# Patient Record
Sex: MALE | Race: BLACK OR AFRICAN AMERICAN | NOT HISPANIC OR LATINO | Employment: OTHER | URBAN - METROPOLITAN AREA
[De-identification: names, ages, dates, MRNs, and addresses within clinical notes are randomized per-mention and may not be internally consistent; named-entity substitution may affect disease eponyms.]

---

## 2017-05-18 ENCOUNTER — HISTORICAL (OUTPATIENT)
Dept: RADIOLOGY | Facility: HOSPITAL | Age: 62
End: 2017-05-18

## 2018-01-08 ENCOUNTER — HISTORICAL (OUTPATIENT)
Dept: ADMINISTRATIVE | Facility: HOSPITAL | Age: 63
End: 2018-01-08

## 2018-01-08 LAB
ABS NEUT (OLG): 0.75 X10(3)/MCL
ALBUMIN SERPL-MCNC: 3.7 GM/DL (ref 3.4–5)
ALBUMIN/GLOB SERPL: 1 RATIO (ref 1–2)
ALP SERPL-CCNC: 56 UNIT/L (ref 45–117)
ALT SERPL-CCNC: 49 UNIT/L (ref 12–78)
AMPHET UR QL SCN: NEGATIVE
AST SERPL-CCNC: 50 UNIT/L (ref 15–37)
BARBITURATE SCN PRESENT UR: NEGATIVE
BASOPHILS NFR BLD MANUAL: 3 %
BENZODIAZ UR QL SCN: NEGATIVE
BILIRUB SERPL-MCNC: 0.3 MG/DL (ref 0.2–1)
BILIRUBIN DIRECT+TOT PNL SERPL-MCNC: 0.1 MG/DL
BILIRUBIN DIRECT+TOT PNL SERPL-MCNC: 0.2 MG/DL
BUN SERPL-MCNC: 11 MG/DL (ref 7–18)
CALCIUM SERPL-MCNC: 8.9 MG/DL (ref 8.5–10.1)
CANNABINOIDS UR QL SCN: NEGATIVE
CHLORIDE SERPL-SCNC: 105 MMOL/L (ref 98–107)
CO2 SERPL-SCNC: 30 MMOL/L (ref 21–32)
COCAINE UR QL SCN: NEGATIVE
CREAT SERPL-MCNC: 0.8 MG/DL (ref 0.6–1.3)
EOSINOPHIL NFR BLD MANUAL: 8 %
ERYTHROCYTE [DISTWIDTH] IN BLOOD BY AUTOMATED COUNT: 14.2 % (ref 11.5–14.5)
ETHANOL SERPL-MCNC: <3 MG/DL
FERRITIN SERPL-MCNC: 199.3 NG/ML (ref 26–388)
GLOBULIN SER-MCNC: 5 GM/ML (ref 2.3–3.5)
GLUCOSE SERPL-MCNC: 83 MG/DL (ref 74–106)
GRANULOCYTES NFR BLD MANUAL: 18 % (ref 43–75)
HAV AB SER QL IA: REACTIVE
HBV CORE AB SERPL QL IA: REACTIVE
HBV SURFACE AB SER-ACNC: >1000 MIU/ML
HBV SURFACE AB SERPL IA-ACNC: REACTIVE M[IU]/ML
HBV SURFACE AG SERPL QL IA: NEGATIVE
HCT VFR BLD AUTO: 42.1 % (ref 40–51)
HGB BLD-MCNC: 14.1 GM/DL (ref 13.5–17.5)
HIV 1+2 AB+HIV1 P24 AG SERPL QL IA: NONREACTIVE
INR PPP: 1.12 (ref 0.9–1.2)
IRON SATN MFR SERPL: 34.5 % (ref 15–50)
IRON SERPL-MCNC: 119 MCG/DL (ref 65–175)
LYMPHOCYTES NFR BLD MANUAL: 1 %
LYMPHOCYTES NFR BLD MANUAL: 62 % (ref 20.5–51.1)
MCH RBC QN AUTO: 29.9 PG (ref 26–34)
MCHC RBC AUTO-ENTMCNC: 33.5 GM/DL (ref 31–37)
MCV RBC AUTO: 89.4 FL (ref 80–100)
MONOCYTES NFR BLD MANUAL: 8 % (ref 2–9)
OPIATES UR QL SCN: NEGATIVE
PCP UR QL: NEGATIVE
PH UR STRIP.AUTO: 7.5 [PH] (ref 5–8)
PLATELET # BLD AUTO: 235 X10(3)/MCL (ref 130–400)
PLATELET # BLD EST: NORMAL 10*3/UL
PMV BLD AUTO: 10.2 FL (ref 7.4–10.4)
POTASSIUM SERPL-SCNC: 4.2 MMOL/L (ref 3.5–5.1)
PROT SERPL-MCNC: 8.7 GM/DL (ref 6.4–8.2)
PROTHROMBIN TIME: 14.2 SECOND(S) (ref 11.9–14.4)
RBC # BLD AUTO: 4.71 X10(6)/MCL (ref 4.5–5.9)
RBC MORPH BLD: NORMAL
SODIUM SERPL-SCNC: 141 MMOL/L (ref 136–145)
T4 FREE SERPL-MCNC: 0.96 NG/DL (ref 0.76–1.46)
TEMPERATURE, URINE (OHS): 24.9 DEGC (ref 20–25)
TIBC SERPL-MCNC: 345 MCG/DL (ref 250–450)
TRANSFERRIN SERPL-MCNC: 279 MG/DL (ref 200–360)
TSH SERPL-ACNC: 1.41 MIU/L (ref 0.36–3.74)
WBC # SPEC AUTO: 4 X10(3)/MCL (ref 4.5–11)

## 2018-03-08 ENCOUNTER — HISTORICAL (OUTPATIENT)
Dept: RADIOLOGY | Facility: HOSPITAL | Age: 63
End: 2018-03-08

## 2018-03-23 ENCOUNTER — HISTORICAL (OUTPATIENT)
Dept: RADIOLOGY | Facility: HOSPITAL | Age: 63
End: 2018-03-23

## 2018-05-11 ENCOUNTER — HISTORICAL (OUTPATIENT)
Dept: ADMINISTRATIVE | Facility: HOSPITAL | Age: 63
End: 2018-05-11

## 2018-05-11 LAB
ABS NEUT (OLG): 0.81 X10(3)/MCL (ref 2.1–9.2)
ALBUMIN SERPL-MCNC: 3.6 GM/DL (ref 3.4–5)
ALBUMIN/GLOB SERPL: 1 RATIO (ref 1–2)
ALP SERPL-CCNC: 62 UNIT/L (ref 45–117)
ALT SERPL-CCNC: 44 UNIT/L (ref 12–78)
AMPHET UR QL SCN: NEGATIVE
AST SERPL-CCNC: 43 UNIT/L (ref 15–37)
BARBITURATE SCN PRESENT UR: NEGATIVE
BASOPHILS # BLD AUTO: 0.03 X10(3)/MCL
BASOPHILS NFR BLD AUTO: 1 %
BENZODIAZ UR QL SCN: NEGATIVE
BILIRUB SERPL-MCNC: 0.3 MG/DL (ref 0.2–1)
BILIRUBIN DIRECT+TOT PNL SERPL-MCNC: 0.1 MG/DL
BILIRUBIN DIRECT+TOT PNL SERPL-MCNC: 0.2 MG/DL
BUN SERPL-MCNC: 19 MG/DL (ref 7–18)
CALCIUM SERPL-MCNC: 8.5 MG/DL (ref 8.5–10.1)
CANNABINOIDS UR QL SCN: NEGATIVE
CHLORIDE SERPL-SCNC: 107 MMOL/L (ref 98–107)
CO2 SERPL-SCNC: 29 MMOL/L (ref 21–32)
COCAINE UR QL SCN: NEGATIVE
CREAT SERPL-MCNC: 0.8 MG/DL (ref 0.6–1.3)
EOSINOPHIL # BLD AUTO: 0.1 X10(3)/MCL
EOSINOPHIL NFR BLD AUTO: 3 %
ERYTHROCYTE [DISTWIDTH] IN BLOOD BY AUTOMATED COUNT: 14.3 % (ref 11.5–14.5)
ETHANOL SERPL-MCNC: <3 MG/DL
GLOBULIN SER-MCNC: 4.3 GM/ML (ref 2.3–3.5)
GLUCOSE SERPL-MCNC: 68 MG/DL (ref 74–106)
HCT VFR BLD AUTO: 41.2 % (ref 40–51)
HGB BLD-MCNC: 13.6 GM/DL (ref 13.5–17.5)
IMM GRANULOCYTES # BLD AUTO: 0.01 10*3/UL
IMM GRANULOCYTES NFR BLD AUTO: 0 %
INR PPP: 1.15 (ref 0.9–1.2)
LYMPHOCYTES # BLD AUTO: 2.11 X10(3)/MCL
LYMPHOCYTES NFR BLD AUTO: 63 % (ref 13–40)
MCH RBC QN AUTO: 29.8 PG (ref 26–34)
MCHC RBC AUTO-ENTMCNC: 33 GM/DL (ref 31–37)
MCV RBC AUTO: 90.2 FL (ref 80–100)
MONOCYTES # BLD AUTO: 0.27 X10(3)/MCL
MONOCYTES NFR BLD AUTO: 8 % (ref 4–12)
NEUTROPHILS # BLD AUTO: 0.81 X10(3)/MCL
NEUTROPHILS NFR BLD AUTO: 24 X10(3)/MCL
OPIATES UR QL SCN: NEGATIVE
PCP UR QL: NEGATIVE
PH UR STRIP.AUTO: 6.5 [PH] (ref 5–8)
PLATELET # BLD AUTO: 232 X10(3)/MCL (ref 130–400)
PMV BLD AUTO: 10 FL (ref 7.4–10.4)
POTASSIUM SERPL-SCNC: 4.8 MMOL/L (ref 3.5–5.1)
PROT SERPL-MCNC: 7.9 GM/DL (ref 6.4–8.2)
PROTHROMBIN TIME: 14 SECOND(S) (ref 11.9–14.4)
RBC # BLD AUTO: 4.57 X10(6)/MCL (ref 4.5–5.9)
SODIUM SERPL-SCNC: 138 MMOL/L (ref 136–145)
TEMPERATURE, URINE (OHS): 24.2 DEGC (ref 20–25)
WBC # SPEC AUTO: 3.3 X10(3)/MCL (ref 4.5–11)

## 2018-10-25 ENCOUNTER — HISTORICAL (OUTPATIENT)
Dept: ADMINISTRATIVE | Facility: HOSPITAL | Age: 63
End: 2018-10-25

## 2018-10-25 LAB
ALBUMIN SERPL-MCNC: 3.8 GM/DL (ref 3.4–5)
ALBUMIN/GLOB SERPL: 1 RATIO (ref 1–2)
ALP SERPL-CCNC: 53 UNIT/L (ref 45–117)
ALT SERPL-CCNC: 59 UNIT/L (ref 12–78)
AST SERPL-CCNC: 52 UNIT/L (ref 15–37)
BILIRUB SERPL-MCNC: 0.4 MG/DL (ref 0.2–1)
BILIRUBIN DIRECT+TOT PNL SERPL-MCNC: 0.1 MG/DL
BILIRUBIN DIRECT+TOT PNL SERPL-MCNC: 0.3 MG/DL
BUN SERPL-MCNC: 15 MG/DL (ref 7–18)
CALCIUM SERPL-MCNC: 9.3 MG/DL (ref 8.5–10.1)
CHLORIDE SERPL-SCNC: 105 MMOL/L (ref 98–107)
CO2 SERPL-SCNC: 29 MMOL/L (ref 21–32)
CREAT SERPL-MCNC: 0.8 MG/DL (ref 0.6–1.3)
ERYTHROCYTE [DISTWIDTH] IN BLOOD BY AUTOMATED COUNT: 14.5 % (ref 11.5–14.5)
GLOBULIN SER-MCNC: 5 GM/ML (ref 2.3–3.5)
GLUCOSE SERPL-MCNC: 79 MG/DL (ref 74–106)
HCT VFR BLD AUTO: 42.1 % (ref 40–51)
HGB BLD-MCNC: 14 GM/DL (ref 13.5–17.5)
MCH RBC QN AUTO: 29.6 PG (ref 26–34)
MCHC RBC AUTO-ENTMCNC: 33.3 GM/DL (ref 31–37)
MCV RBC AUTO: 89 FL (ref 80–100)
PLATELET # BLD AUTO: 246 X10(3)/MCL (ref 130–400)
PMV BLD AUTO: 10.9 FL (ref 7.4–10.4)
POTASSIUM SERPL-SCNC: 5.2 MMOL/L (ref 3.5–5.1)
PROT SERPL-MCNC: 8.8 GM/DL (ref 6.4–8.2)
RBC # BLD AUTO: 4.73 X10(6)/MCL (ref 4.5–5.9)
SODIUM SERPL-SCNC: 141 MMOL/L (ref 136–145)
WBC # SPEC AUTO: 4.2 X10(3)/MCL (ref 4.5–11)

## 2018-11-01 ENCOUNTER — HISTORICAL (OUTPATIENT)
Dept: SURGERY | Facility: HOSPITAL | Age: 63
End: 2018-11-01

## 2018-11-01 LAB
AMPHET UR QL SCN: NEGATIVE
BARBITURATE SCN PRESENT UR: NEGATIVE
BENZODIAZ UR QL SCN: NEGATIVE
CANNABINOIDS UR QL SCN: NEGATIVE
COCAINE UR QL SCN: NEGATIVE
OPIATES UR QL SCN: NEGATIVE
PCP UR QL: NEGATIVE
PH UR STRIP.AUTO: 6 [PH] (ref 5–8)
TEMPERATURE, URINE (OHS): 25 DEGC (ref 20–25)

## 2018-11-15 ENCOUNTER — HISTORICAL (OUTPATIENT)
Dept: SURGERY | Facility: HOSPITAL | Age: 63
End: 2018-11-15

## 2019-03-04 ENCOUNTER — HISTORICAL (OUTPATIENT)
Dept: ADMINISTRATIVE | Facility: HOSPITAL | Age: 64
End: 2019-03-04

## 2019-03-04 LAB
ABS NEUT (OLG): 0.99 X10(3)/MCL (ref 2.1–9.2)
ALBUMIN SERPL-MCNC: 3.8 GM/DL (ref 3.4–5)
ALBUMIN/GLOB SERPL: 0.8 RATIO (ref 1.1–2)
ALP SERPL-CCNC: 53 UNIT/L (ref 45–117)
ALT SERPL-CCNC: 60 UNIT/L (ref 12–78)
AST SERPL-CCNC: 51 UNIT/L (ref 15–37)
BASOPHILS # BLD AUTO: 0.03 X10(3)/MCL
BASOPHILS NFR BLD AUTO: 1 %
BILIRUB SERPL-MCNC: 0.4 MG/DL (ref 0.2–1)
BILIRUBIN DIRECT+TOT PNL SERPL-MCNC: 0.2 MG/DL
BILIRUBIN DIRECT+TOT PNL SERPL-MCNC: 0.2 MG/DL
BUN SERPL-MCNC: 16 MG/DL (ref 7–18)
CALCIUM SERPL-MCNC: 9.1 MG/DL (ref 8.5–10.1)
CHLORIDE SERPL-SCNC: 106 MMOL/L (ref 98–107)
CO2 SERPL-SCNC: 30 MMOL/L (ref 21–32)
CREAT SERPL-MCNC: 0.9 MG/DL (ref 0.6–1.3)
EOSINOPHIL # BLD AUTO: 0.18 X10(3)/MCL
EOSINOPHIL NFR BLD AUTO: 5 %
ERYTHROCYTE [DISTWIDTH] IN BLOOD BY AUTOMATED COUNT: 14.2 % (ref 11.5–14.5)
FERRITIN SERPL-MCNC: 199.7 NG/ML (ref 26–388)
GLOBULIN SER-MCNC: 5 GM/ML (ref 2.3–3.5)
GLUCOSE SERPL-MCNC: 56 MG/DL (ref 74–106)
HAV AB SER QL IA: REACTIVE
HBV CORE AB SERPL QL IA: REACTIVE
HBV SURFACE AB SER-ACNC: >1000 MIU/ML
HBV SURFACE AB SERPL IA-ACNC: REACTIVE M[IU]/ML
HBV SURFACE AG SERPL QL IA: NEGATIVE
HCT VFR BLD AUTO: 44.6 % (ref 40–51)
HGB BLD-MCNC: 14.7 GM/DL (ref 13.5–17.5)
HIV 1+2 AB+HIV1 P24 AG SERPL QL IA: NONREACTIVE
IMM GRANULOCYTES # BLD AUTO: 0.01 10*3/UL
IMM GRANULOCYTES NFR BLD AUTO: 0 %
INR PPP: 1.14 (ref 0.9–1.2)
IRON SATN MFR SERPL: 43.5 % (ref 15–50)
IRON SERPL-MCNC: 155 MCG/DL (ref 65–175)
LYMPHOCYTES # BLD AUTO: 2.35 X10(3)/MCL
LYMPHOCYTES NFR BLD AUTO: 62 % (ref 13–40)
MCH RBC QN AUTO: 29.6 PG (ref 26–34)
MCHC RBC AUTO-ENTMCNC: 33 GM/DL (ref 31–37)
MCV RBC AUTO: 89.9 FL (ref 80–100)
MONOCYTES # BLD AUTO: 0.26 X10(3)/MCL
MONOCYTES NFR BLD AUTO: 7 % (ref 4–12)
NEUTROPHILS # BLD AUTO: 0.99 X10(3)/MCL
NEUTROPHILS NFR BLD AUTO: 26 X10(3)/MCL
PLATELET # BLD AUTO: 245 X10(3)/MCL (ref 130–400)
PMV BLD AUTO: 10.3 FL (ref 7.4–10.4)
POTASSIUM SERPL-SCNC: 4 MMOL/L (ref 3.5–5.1)
PROT SERPL-MCNC: 8.8 GM/DL (ref 6.4–8.2)
PROTHROMBIN TIME: 14.5 SECOND(S) (ref 11.9–14.4)
RBC # BLD AUTO: 4.96 X10(6)/MCL (ref 4.5–5.9)
SODIUM SERPL-SCNC: 139 MMOL/L (ref 136–145)
T PALLIDUM AB SER QL: NONREACTIVE
T4 FREE SERPL-MCNC: 0.85 NG/DL (ref 0.76–1.46)
TIBC SERPL-MCNC: 356 MCG/DL (ref 250–450)
TRANSFERRIN SERPL-MCNC: 283 MG/DL (ref 200–360)
TSH SERPL-ACNC: 0.66 MIU/L (ref 0.36–3.74)
WBC # SPEC AUTO: 3.8 X10(3)/MCL (ref 4.5–11)

## 2019-04-05 ENCOUNTER — HISTORICAL (OUTPATIENT)
Dept: RADIOLOGY | Facility: HOSPITAL | Age: 64
End: 2019-04-05

## 2019-05-17 ENCOUNTER — HISTORICAL (OUTPATIENT)
Dept: RADIOLOGY | Facility: HOSPITAL | Age: 64
End: 2019-05-17

## 2019-05-17 LAB
BUN SERPL-MCNC: 17 MG/DL (ref 7–18)
CALCIUM SERPL-MCNC: 8.9 MG/DL (ref 8.5–10.1)
CHLORIDE SERPL-SCNC: 108 MMOL/L (ref 98–107)
CO2 SERPL-SCNC: 31 MMOL/L (ref 21–32)
CREAT SERPL-MCNC: 0.9 MG/DL (ref 0.6–1.3)
CREAT/UREA NIT SERPL: 19
GLUCOSE SERPL-MCNC: 85 MG/DL (ref 74–106)
POTASSIUM SERPL-SCNC: 4.6 MMOL/L (ref 3.5–5.1)
SODIUM SERPL-SCNC: 140 MMOL/L (ref 136–145)

## 2019-12-03 ENCOUNTER — HISTORICAL (OUTPATIENT)
Dept: ADMINISTRATIVE | Facility: HOSPITAL | Age: 64
End: 2019-12-03

## 2019-12-03 LAB
ABS NEUT (OLG): 0.71 X10(3)/MCL (ref 2.1–9.2)
ALBUMIN SERPL-MCNC: 3.7 GM/DL (ref 3.4–5)
ALBUMIN/GLOB SERPL: 0.8 RATIO (ref 1.1–2)
ALP SERPL-CCNC: 55 UNIT/L (ref 45–117)
ALT SERPL-CCNC: 118 UNIT/L (ref 12–78)
AST SERPL-CCNC: 108 UNIT/L (ref 15–37)
BASOPHILS # BLD AUTO: 0 X10(3)/MCL (ref 0–0.2)
BASOPHILS NFR BLD AUTO: 1 %
BILIRUB SERPL-MCNC: 0.4 MG/DL (ref 0.2–1)
BILIRUBIN DIRECT+TOT PNL SERPL-MCNC: 0.2 MG/DL
BILIRUBIN DIRECT+TOT PNL SERPL-MCNC: 0.2 MG/DL (ref 0–0.2)
BUN SERPL-MCNC: 12 MG/DL (ref 7–18)
CALCIUM SERPL-MCNC: 9.2 MG/DL (ref 8.5–10.1)
CHLORIDE SERPL-SCNC: 106 MMOL/L (ref 98–107)
CO2 SERPL-SCNC: 30 MMOL/L (ref 21–32)
CREAT SERPL-MCNC: 0.8 MG/DL (ref 0.6–1.3)
EOSINOPHIL # BLD AUTO: 0.2 X10(3)/MCL (ref 0–0.9)
EOSINOPHIL NFR BLD AUTO: 7 %
ERYTHROCYTE [DISTWIDTH] IN BLOOD BY AUTOMATED COUNT: 14 % (ref 11.5–14.5)
FERRITIN SERPL-MCNC: 263 NG/ML (ref 26–388)
GLOBULIN SER-MCNC: 4.9 GM/ML (ref 2.3–3.5)
GLUCOSE SERPL-MCNC: 92 MG/DL (ref 74–106)
HAV AB SER QL IA: REACTIVE
HBV SURFACE AG SERPL QL IA: NEGATIVE
HCT VFR BLD AUTO: 43.6 % (ref 40–51)
HGB BLD-MCNC: 14.4 GM/DL (ref 13.5–17.5)
HIV 1+2 AB+HIV1 P24 AG SERPL QL IA: NONREACTIVE
IMM GRANULOCYTES # BLD AUTO: 0.01 10*3/UL
IMM GRANULOCYTES NFR BLD AUTO: 0 %
INR PPP: 1.1 (ref 0.9–1.2)
LYMPHOCYTES # BLD AUTO: 1.9 X10(3)/MCL (ref 0.6–4.6)
LYMPHOCYTES NFR BLD AUTO: 62 %
MCH RBC QN AUTO: 29.6 PG (ref 26–34)
MCHC RBC AUTO-ENTMCNC: 33 GM/DL (ref 31–37)
MCV RBC AUTO: 89.7 FL (ref 80–100)
MONOCYTES # BLD AUTO: 0.3 X10(3)/MCL (ref 0.1–1.3)
MONOCYTES NFR BLD AUTO: 8 %
NEUTROPHILS # BLD AUTO: 0.71 X10(3)/MCL (ref 2.1–9.2)
NEUTROPHILS NFR BLD AUTO: 22 %
PLATELET # BLD AUTO: 246 X10(3)/MCL (ref 130–400)
PMV BLD AUTO: 10.4 FL (ref 7.4–10.4)
POTASSIUM SERPL-SCNC: 4.3 MMOL/L (ref 3.5–5.1)
PROT SERPL-MCNC: 8.6 GM/DL (ref 6.4–8.2)
PROTHROMBIN TIME: 14.1 SECOND(S) (ref 11.9–14.4)
RBC # BLD AUTO: 4.86 X10(6)/MCL (ref 4.5–5.9)
SODIUM SERPL-SCNC: 140 MMOL/L (ref 136–145)
T PALLIDUM AB SER QL: NONREACTIVE
WBC # SPEC AUTO: 3.2 X10(3)/MCL (ref 4.5–11)

## 2020-02-10 ENCOUNTER — HISTORICAL (OUTPATIENT)
Dept: ADMINISTRATIVE | Facility: HOSPITAL | Age: 65
End: 2020-02-10

## 2020-02-10 LAB
ABS NEUT (OLG): 1.45 X10(3)/MCL (ref 2.1–9.2)
ALBUMIN SERPL-MCNC: 3.6 GM/DL (ref 3.4–5)
ALBUMIN/GLOB SERPL: 0.6 RATIO (ref 1.1–2)
ALP SERPL-CCNC: 64 UNIT/L (ref 45–117)
ALT SERPL-CCNC: 32 UNIT/L (ref 12–78)
AST SERPL-CCNC: 32 UNIT/L (ref 15–37)
BASOPHILS # BLD AUTO: 0 X10(3)/MCL (ref 0–0.2)
BASOPHILS NFR BLD AUTO: 1 %
BILIRUB SERPL-MCNC: 0.4 MG/DL (ref 0.2–1)
BILIRUBIN DIRECT+TOT PNL SERPL-MCNC: 0.1 MG/DL (ref 0–0.2)
BILIRUBIN DIRECT+TOT PNL SERPL-MCNC: 0.3 MG/DL
BUN SERPL-MCNC: 15 MG/DL (ref 7–18)
CALCIUM SERPL-MCNC: 9.8 MG/DL (ref 8.5–10.1)
CHLORIDE SERPL-SCNC: 105 MMOL/L (ref 98–107)
CO2 SERPL-SCNC: 30 MMOL/L (ref 21–32)
CREAT SERPL-MCNC: 0.9 MG/DL (ref 0.6–1.3)
EOSINOPHIL # BLD AUTO: 0.2 X10(3)/MCL (ref 0–0.9)
EOSINOPHIL NFR BLD AUTO: 5 %
ERYTHROCYTE [DISTWIDTH] IN BLOOD BY AUTOMATED COUNT: 13.8 % (ref 11.5–14.5)
GLOBULIN SER-MCNC: 5.8 GM/ML (ref 2.3–3.5)
GLUCOSE SERPL-MCNC: 91 MG/DL (ref 74–106)
HCT VFR BLD AUTO: 44.2 % (ref 40–51)
HGB BLD-MCNC: 14.5 GM/DL (ref 13.5–17.5)
IMM GRANULOCYTES # BLD AUTO: 0.01 10*3/UL
IMM GRANULOCYTES NFR BLD AUTO: 0 %
LYMPHOCYTES # BLD AUTO: 2.6 X10(3)/MCL (ref 0.6–4.6)
LYMPHOCYTES NFR BLD AUTO: 53 %
MCH RBC QN AUTO: 29.5 PG (ref 26–34)
MCHC RBC AUTO-ENTMCNC: 32.8 GM/DL (ref 31–37)
MCV RBC AUTO: 89.8 FL (ref 80–100)
MONOCYTES # BLD AUTO: 0.6 X10(3)/MCL (ref 0.1–1.3)
MONOCYTES NFR BLD AUTO: 12 %
NEUTROPHILS # BLD AUTO: 1.45 X10(3)/MCL (ref 2.1–9.2)
NEUTROPHILS NFR BLD AUTO: 29 %
PLATELET # BLD AUTO: 274 X10(3)/MCL (ref 130–400)
PMV BLD AUTO: 10.3 FL (ref 7.4–10.4)
POTASSIUM SERPL-SCNC: 4.8 MMOL/L (ref 3.5–5.1)
PROT SERPL-MCNC: 9.4 GM/DL (ref 6.4–8.2)
RBC # BLD AUTO: 4.92 X10(6)/MCL (ref 4.5–5.9)
SODIUM SERPL-SCNC: 138 MMOL/L (ref 136–145)
WBC # SPEC AUTO: 4.9 X10(3)/MCL (ref 4.5–11)

## 2020-03-09 ENCOUNTER — HISTORICAL (OUTPATIENT)
Dept: ADMINISTRATIVE | Facility: HOSPITAL | Age: 65
End: 2020-03-09

## 2020-03-09 LAB
ABS NEUT (OLG): 1.04 X10(3)/MCL (ref 2.1–9.2)
ALBUMIN SERPL-MCNC: 3.5 GM/DL (ref 3.4–5)
ALBUMIN/GLOB SERPL: 0.7 RATIO (ref 1.1–2)
ALP SERPL-CCNC: 54 UNIT/L (ref 45–117)
ALT SERPL-CCNC: 35 UNIT/L (ref 12–78)
AST SERPL-CCNC: 37 UNIT/L (ref 15–37)
BASOPHILS # BLD AUTO: 0 X10(3)/MCL (ref 0–0.2)
BASOPHILS NFR BLD AUTO: 1 %
BILIRUB SERPL-MCNC: 0.3 MG/DL (ref 0.2–1)
BILIRUBIN DIRECT+TOT PNL SERPL-MCNC: <0.1 MG/DL (ref 0–0.2)
BILIRUBIN DIRECT+TOT PNL SERPL-MCNC: ABNORMAL MG/DL
BUN SERPL-MCNC: 14 MG/DL (ref 7–18)
CALCIUM SERPL-MCNC: 9.3 MG/DL (ref 8.5–10.1)
CHLORIDE SERPL-SCNC: 109 MMOL/L (ref 98–107)
CO2 SERPL-SCNC: 29 MMOL/L (ref 21–32)
CREAT SERPL-MCNC: 0.8 MG/DL (ref 0.6–1.3)
EOSINOPHIL # BLD AUTO: 0.4 X10(3)/MCL (ref 0–0.9)
EOSINOPHIL NFR BLD AUTO: 9 %
ERYTHROCYTE [DISTWIDTH] IN BLOOD BY AUTOMATED COUNT: 13.7 % (ref 11.5–14.5)
GLOBULIN SER-MCNC: 5 GM/ML (ref 2.3–3.5)
GLUCOSE SERPL-MCNC: 94 MG/DL (ref 74–106)
HCT VFR BLD AUTO: 42.4 % (ref 40–51)
HGB BLD-MCNC: 13.5 GM/DL (ref 13.5–17.5)
IMM GRANULOCYTES # BLD AUTO: 0.01 10*3/UL
IMM GRANULOCYTES NFR BLD AUTO: 0 %
LYMPHOCYTES # BLD AUTO: 2.4 X10(3)/MCL (ref 0.6–4.6)
LYMPHOCYTES NFR BLD AUTO: 56 %
MCH RBC QN AUTO: 28.8 PG (ref 26–34)
MCHC RBC AUTO-ENTMCNC: 31.8 GM/DL (ref 31–37)
MCV RBC AUTO: 90.6 FL (ref 80–100)
MONOCYTES # BLD AUTO: 0.4 X10(3)/MCL (ref 0.1–1.3)
MONOCYTES NFR BLD AUTO: 9 %
NEUTROPHILS # BLD AUTO: 1.04 X10(3)/MCL (ref 2.1–9.2)
NEUTROPHILS NFR BLD AUTO: 25 %
PLATELET # BLD AUTO: 241 X10(3)/MCL (ref 130–400)
PMV BLD AUTO: 10.3 FL (ref 7.4–10.4)
POTASSIUM SERPL-SCNC: 4.7 MMOL/L (ref 3.5–5.1)
PROT SERPL-MCNC: 8.5 GM/DL (ref 6.4–8.2)
RBC # BLD AUTO: 4.68 X10(6)/MCL (ref 4.5–5.9)
SODIUM SERPL-SCNC: 140 MMOL/L (ref 136–145)
WBC # SPEC AUTO: 4.2 X10(3)/MCL (ref 4.5–11)

## 2020-03-11 ENCOUNTER — HISTORICAL (OUTPATIENT)
Dept: RADIOLOGY | Facility: HOSPITAL | Age: 65
End: 2020-03-11

## 2020-04-17 ENCOUNTER — HISTORICAL (OUTPATIENT)
Dept: ADMINISTRATIVE | Facility: HOSPITAL | Age: 65
End: 2020-04-17

## 2020-04-17 LAB
ABS NEUT (OLG): 1.08 X10(3)/MCL (ref 2.1–9.2)
ALBUMIN SERPL-MCNC: 3.6 GM/DL (ref 3.4–5)
ALBUMIN/GLOB SERPL: 0.8 RATIO (ref 1.1–2)
ALP SERPL-CCNC: 51 UNIT/L (ref 45–117)
ALT SERPL-CCNC: 29 UNIT/L (ref 12–78)
AST SERPL-CCNC: 28 UNIT/L (ref 15–37)
BASOPHILS NFR BLD MANUAL: 0 %
BILIRUB SERPL-MCNC: 0.2 MG/DL (ref 0.2–1)
BILIRUBIN DIRECT+TOT PNL SERPL-MCNC: 0.1 MG/DL
BILIRUBIN DIRECT+TOT PNL SERPL-MCNC: 0.1 MG/DL (ref 0–0.2)
BUN SERPL-MCNC: 14 MG/DL (ref 7–18)
CALCIUM SERPL-MCNC: 9.2 MG/DL (ref 8.5–10.1)
CHLORIDE SERPL-SCNC: 109 MMOL/L (ref 98–107)
CO2 SERPL-SCNC: 30 MMOL/L (ref 21–32)
CREAT SERPL-MCNC: 0.9 MG/DL (ref 0.6–1.3)
EOSINOPHIL NFR BLD MANUAL: 11 %
ERYTHROCYTE [DISTWIDTH] IN BLOOD BY AUTOMATED COUNT: 14.3 % (ref 11.5–14.5)
GLOBULIN SER-MCNC: 4.8 GM/ML (ref 2.3–3.5)
GLUCOSE SERPL-MCNC: 91 MG/DL (ref 74–106)
GRANULOCYTES NFR BLD MANUAL: 33 % (ref 43–75)
HCT VFR BLD AUTO: 43.2 % (ref 40–51)
HGB BLD-MCNC: 13.8 GM/DL (ref 13.5–17.5)
LYMPHOCYTES NFR BLD MANUAL: 54 % (ref 20.5–51.1)
MCH RBC QN AUTO: 29.2 PG (ref 26–34)
MCHC RBC AUTO-ENTMCNC: 31.9 GM/DL (ref 31–37)
MCV RBC AUTO: 91.5 FL (ref 80–100)
MONOCYTES NFR BLD MANUAL: 2 % (ref 2–9)
PLATELET # BLD AUTO: 239 X10(3)/MCL (ref 130–400)
PLATELET # BLD EST: ADEQUATE 10*3/UL
PMV BLD AUTO: 10.4 FL (ref 7.4–10.4)
POTASSIUM SERPL-SCNC: 5.2 MMOL/L (ref 3.5–5.1)
PROT SERPL-MCNC: 8.4 GM/DL (ref 6.4–8.2)
RBC # BLD AUTO: 4.72 X10(6)/MCL (ref 4.5–5.9)
RBC MORPH BLD: NORMAL
SODIUM SERPL-SCNC: 140 MMOL/L (ref 136–145)
WBC # SPEC AUTO: 4.2 X10(3)/MCL (ref 4.5–11)

## 2020-04-21 ENCOUNTER — HISTORICAL (OUTPATIENT)
Dept: ADMINISTRATIVE | Facility: HOSPITAL | Age: 65
End: 2020-04-21

## 2020-04-21 LAB
BUN SERPL-MCNC: 20 MG/DL (ref 7–18)
CALCIUM SERPL-MCNC: 9.3 MG/DL (ref 8.5–10.1)
CHLORIDE SERPL-SCNC: 107 MMOL/L (ref 98–107)
CO2 SERPL-SCNC: 28 MMOL/L (ref 21–32)
CREAT SERPL-MCNC: 0.9 MG/DL (ref 0.6–1.3)
CREAT/UREA NIT SERPL: 22
GLUCOSE SERPL-MCNC: 95 MG/DL (ref 74–106)
POTASSIUM SERPL-SCNC: 5.2 MMOL/L (ref 3.5–5.1)
SODIUM SERPL-SCNC: 138 MMOL/L (ref 136–145)

## 2020-04-24 ENCOUNTER — HISTORICAL (OUTPATIENT)
Dept: ADMINISTRATIVE | Facility: HOSPITAL | Age: 65
End: 2020-04-24

## 2020-04-24 LAB
BUN SERPL-MCNC: 16 MG/DL (ref 7–18)
CALCIUM SERPL-MCNC: 9.6 MG/DL (ref 8.5–10.1)
CHLORIDE SERPL-SCNC: 107 MMOL/L (ref 98–107)
CO2 SERPL-SCNC: 30 MMOL/L (ref 21–32)
CREAT SERPL-MCNC: 0.9 MG/DL (ref 0.6–1.3)
CREAT/UREA NIT SERPL: 18
GLUCOSE SERPL-MCNC: 96 MG/DL (ref 74–106)
POTASSIUM SERPL-SCNC: 5.2 MMOL/L (ref 3.5–5.1)
SODIUM SERPL-SCNC: 140 MMOL/L (ref 136–145)

## 2020-04-28 ENCOUNTER — HISTORICAL (OUTPATIENT)
Dept: RADIOLOGY | Facility: HOSPITAL | Age: 65
End: 2020-04-28

## 2020-07-22 ENCOUNTER — HISTORICAL (OUTPATIENT)
Dept: ADMINISTRATIVE | Facility: HOSPITAL | Age: 65
End: 2020-07-22

## 2021-05-25 ENCOUNTER — HISTORICAL (OUTPATIENT)
Dept: ADMINISTRATIVE | Facility: HOSPITAL | Age: 66
End: 2021-05-25

## 2021-05-25 LAB
BUN SERPL-MCNC: 12.9 MG/DL (ref 8.4–25.7)
CALCIUM SERPL-MCNC: 10.5 MG/DL (ref 8.8–10)
CHLORIDE SERPL-SCNC: 104 MMOL/L (ref 98–107)
CO2 SERPL-SCNC: 28 MMOL/L (ref 23–31)
CREAT SERPL-MCNC: 0.81 MG/DL (ref 0.73–1.18)
CREAT/UREA NIT SERPL: 16
GLUCOSE SERPL-MCNC: 80 MG/DL (ref 82–115)
POTASSIUM SERPL-SCNC: 5.1 MMOL/L (ref 3.5–5.1)
SARS-COV-2 RNA RESP QL NAA+PROBE: NOT DETECTED
SODIUM SERPL-SCNC: 140 MMOL/L (ref 136–145)

## 2021-05-27 ENCOUNTER — HISTORICAL (OUTPATIENT)
Dept: SURGERY | Facility: HOSPITAL | Age: 66
End: 2021-05-27

## 2022-01-18 ENCOUNTER — HISTORICAL (OUTPATIENT)
Dept: CARDIOLOGY | Facility: HOSPITAL | Age: 67
End: 2022-01-18

## 2022-04-11 ENCOUNTER — HISTORICAL (OUTPATIENT)
Dept: ADMINISTRATIVE | Facility: HOSPITAL | Age: 67
End: 2022-04-11
Payer: MEDICAID

## 2022-04-24 VITALS
OXYGEN SATURATION: 100 % | DIASTOLIC BLOOD PRESSURE: 90 MMHG | SYSTOLIC BLOOD PRESSURE: 147 MMHG | WEIGHT: 154.31 LBS | BODY MASS INDEX: 22.85 KG/M2 | HEIGHT: 69 IN

## 2022-05-04 NOTE — HISTORICAL [facility] CERNER
This is a historical note converted from Lesa. Formatting and pictures may have been removed.  Please reference Lesa for original formatting and attached multimedia. HPI: PT here for REPEAT DSEK + IOL REPOSITIONING. Denies changes in health or medications since the patient was last seen in clinic.  ?  ROS: Negative x 10  ?  ?  General NAP  HENT atraumatic  Eyes: CORNEAL EDEMA RIGHT EYE, IOL MALPOSITION RIGHT EYE  Neck: nontender, no masses or thyromegaly  Respiratory: no distress on room air  Cardiovascular: regular rate  Gastrointestinal: no hepatomegaly?  Lymphatics: no lymphadenopathy  Musculoskeletal: wnl  ?  ?   Assessment/Plan  1. CORNEAL EDEMA/ FAILED GRAFT + MISPOSTIONED IOL  - Pt complains of poor vision  - After extensive discussion of R/B/A, informed consent was?already?signed in clinic.  - All questions were answered.  - Proceed to OR for DSEAK + IOL REPOSITIONING  ?

## 2022-07-05 DIAGNOSIS — M54.40 LUMBAGO WITH SCIATICA: Primary | ICD-10-CM

## 2022-08-01 ENCOUNTER — HOSPITAL ENCOUNTER (OUTPATIENT)
Dept: RADIOLOGY | Facility: HOSPITAL | Age: 67
Discharge: HOME | End: 2022-08-01
Attending: INTERNAL MEDICINE
Payer: MEDICARE

## 2022-08-01 DIAGNOSIS — M54.40 LUMBAGO WITH SCIATICA: ICD-10-CM

## 2022-08-01 PROCEDURE — 72148 MRI LUMBAR SPINE W/O DYE: CPT | Mod: TC

## 2022-08-02 ENCOUNTER — OFFICE VISIT (OUTPATIENT)
Dept: OPHTHALMOLOGY | Facility: CLINIC | Age: 67
End: 2022-08-02
Payer: MEDICARE

## 2022-08-02 VITALS — HEIGHT: 69 IN | BODY MASS INDEX: 22.85 KG/M2 | WEIGHT: 154.31 LBS

## 2022-08-02 DIAGNOSIS — Z96.1 PSEUDOPHAKIA: ICD-10-CM

## 2022-08-02 DIAGNOSIS — Z86.69 HISTORY OF RETINAL DETACHMENT: ICD-10-CM

## 2022-08-02 DIAGNOSIS — H18.11 BULLOUS KERATOPATHY OF RIGHT EYE: Primary | ICD-10-CM

## 2022-08-02 PROCEDURE — 99213 OFFICE O/P EST LOW 20 MIN: CPT | Mod: PBBFAC,PO | Performed by: STUDENT IN AN ORGANIZED HEALTH CARE EDUCATION/TRAINING PROGRAM

## 2022-08-02 PROCEDURE — 76512 OPH US DX B-SCAN: CPT | Mod: PBBFAC,PO | Performed by: STUDENT IN AN ORGANIZED HEALTH CARE EDUCATION/TRAINING PROGRAM

## 2022-08-02 RX ORDER — GABAPENTIN 800 MG/1
TABLET ORAL
COMMUNITY
Start: 2022-06-01

## 2022-08-02 RX ORDER — NORTRIPTYLINE HYDROCHLORIDE 10 MG/1
CAPSULE ORAL
COMMUNITY
Start: 2022-07-21

## 2022-08-02 RX ORDER — AMITRIPTYLINE HYDROCHLORIDE 10 MG/1
10 TABLET, FILM COATED ORAL NIGHTLY
COMMUNITY
Start: 2022-05-22

## 2022-08-02 RX ORDER — DULOXETIN HYDROCHLORIDE 30 MG/1
120 CAPSULE, DELAYED RELEASE ORAL DAILY
COMMUNITY
Start: 2022-06-29

## 2022-08-02 RX ORDER — DORZOLAMIDE HYDROCHLORIDE AND TIMOLOL MALEATE 20; 5 MG/ML; MG/ML
1 SOLUTION/ DROPS OPHTHALMIC
COMMUNITY
Start: 2021-07-01 | End: 2023-06-06 | Stop reason: ALTCHOICE

## 2022-08-02 RX ORDER — BACLOFEN 20 MG/1
20 TABLET ORAL
COMMUNITY
End: 2023-02-02

## 2022-08-02 RX ORDER — IBUPROFEN 200 MG
TABLET ORAL
COMMUNITY
Start: 2021-06-24

## 2022-08-02 RX ORDER — DICLOFENAC SODIUM 75 MG/1
75 TABLET, DELAYED RELEASE ORAL 2 TIMES DAILY
COMMUNITY
Start: 2022-05-26

## 2022-08-02 RX ORDER — PREDNISOLONE ACETATE 10 MG/ML
1 SUSPENSION/ DROPS OPHTHALMIC
COMMUNITY
Start: 2021-08-24 | End: 2023-05-16

## 2022-08-02 NOTE — PROGRESS NOTES
HPI     RTC 4 mos general DFE/ Bscan OD    Last edited by Lizbeth Gonzalez MA on 8/2/2022  8:30 AM. (History)            Assessment /Plan     For exam results, see Encounter Report.    There are no diagnoses linked to this encounter.    1. Microcystic corneal edema/bullous keratopathy  s/p MULTIPLE SURGERIES: most recently repeat DSAEK and IOL 5/2021  - Likely 2/2 h/o multiple surgeries (complex CE, RD repair, secondary IOL) including hx of an iris-sutured IOL  - s/p DSAEK OD (11/1/18); intraop discovered that sutured IOL had been sutured into superior cornea -> graft intentionally decentered inferiorly, could not reconstruct superior anterior chamber without cutting IOL sutures  - Graft became detached POW1 --> went to OR for revision 11/15/18  - BCVA after DSAEK documented 5/2019 @ 20/100 (20/400 prior to DSAEK)  - s/p 5/2021: repeat DSAEK and IOL- - had repeat DSAEK and IOL 5/2021, graft was detached postop, IOL is decentered  TESTING HISTORY  - Topography done May 2020, noted to have irregular astigmatism  - PLAN BY Dr. Almodovar 10/29/21 - IOP 22 OD. No intervention recommended. Patient with chronic ocular surface pain. May benefit from a procedure to improve comfort. Recommend comfort care in right eye and continued monitoring of left eye. Will try to arrange patient to go to Dr. Almodovar's office for scleral contact lens fitting for comfort OD.   - GTTS: pred forte qd- bid; check IOP every exam  - 8/2/22 provided patient with information to Dr. Almodoavr clinic. Start Michael 128 OD TID. B scan w/o masses OD  - RTC 6 mo for K check    2. ERM, OD  - Previously imaged on Mac OCT in January 2019  - Mild-to-moderate in severity per Dr. Bowles in June 2019  - Stable thickening on OCT Mac 9/2/20, no IRF/SRF seen  - Likely visually significant given NIKIA results 9/2/20 (20/150) in addition to #1  - CTM    3. Glaucoma suspect, OU  4. ANGLE closure suspect   - Previous C/D ratio documented at 0.7 OS. However 8/2/22 C/D appears  to be closer to 0.4 and p/s.  - Last //582  - Gonio 9/2/20: unable to 2/2 edema OD, but angle appears narrow temporally via Von Herick estimation//open to  OS  - OCT RNFL 4/2022: OS- all green  - IOP wnl OS  - Low suspicion for glaucoma OS at this time. Can monitor with annual DFE and OCT rnfl. If next RNFL all green, can stop annual RNFL.    5. PCIOL OS s/p yag cap  - [facility] 20/25 in clinic on 8/24/21  - Continue to monitor    6. H/o retinal detachment, OD  - S/p PPV/lensectomy/IOL removal/EL/AFx/C3F8 16% for dislocated sulcus IOL with retinal detachment OD    7. Monocular precautions   - poly carb given 4/22      RTC 6 mo for K check

## 2022-08-03 PROBLEM — H18.11 BULLOUS KERATOPATHY OF RIGHT EYE: Status: ACTIVE | Noted: 2022-08-03

## 2022-08-03 PROBLEM — Z86.69 HISTORY OF RETINAL DETACHMENT: Status: ACTIVE | Noted: 2022-08-03

## 2023-02-02 ENCOUNTER — OFFICE VISIT (OUTPATIENT)
Dept: OPHTHALMOLOGY | Facility: CLINIC | Age: 68
End: 2023-02-02
Payer: MEDICARE

## 2023-02-02 VITALS — WEIGHT: 154.31 LBS | BODY MASS INDEX: 23.39 KG/M2 | HEIGHT: 68 IN

## 2023-02-02 DIAGNOSIS — H18.11 BULLOUS KERATOPATHY OF RIGHT EYE: Primary | ICD-10-CM

## 2023-02-02 PROCEDURE — 99213 OFFICE O/P EST LOW 20 MIN: CPT | Mod: PBBFAC,PO | Performed by: STUDENT IN AN ORGANIZED HEALTH CARE EDUCATION/TRAINING PROGRAM

## 2023-02-02 RX ORDER — TEMAZEPAM 15 MG/1
15 CAPSULE ORAL NIGHTLY PRN
COMMUNITY
Start: 2023-01-27

## 2023-02-02 RX ORDER — SILDENAFIL 100 MG/1
50-100 TABLET, FILM COATED ORAL DAILY PRN
COMMUNITY
Start: 2022-11-29

## 2023-02-02 RX ORDER — BACLOFEN 10 MG/1
10 TABLET ORAL
COMMUNITY
Start: 2022-12-27

## 2023-02-02 NOTE — PROGRESS NOTES
Assessment /Plan     For exam results, see Encounter Report.    Bullous keratopathy of right eye                         1. Microcystic corneal edema/bullous keratopathy  s/p MULTIPLE SURGERIES: most recently repeat DSAEK and IOL 5/2021  - Likely 2/2 h/o multiple surgeries (complex CE, RD repair, secondary IOL) including hx of an iris-sutured IOL  - s/p DSAEK OD (11/1/18); intraop discovered that sutured IOL had been sutured into superior cornea -> graft intentionally decentered inferiorly, could not reconstruct superior anterior chamber without cutting IOL sutures  - Graft became detached POW1 --> went to OR for revision 11/15/18  - BCVA after DSAEK documented 5/2019 @ 20/100 (20/400 prior to DSAEK)  - s/p 5/2021: repeat DSAEK and IOL- - had repeat DSAEK and IOL 5/2021, graft was detached postop, IOL is decentered  TESTING HISTORY  - Topography done May 2020, noted to have irregular astigmatism  - PLAN BY Dr. Almodovar 10/29/21 - IOP 22 OD. No intervention recommended. Patient with chronic ocular surface pain. May benefit from a procedure to improve comfort. Recommend comfort care in right eye and continued monitoring of left eye. Will try to arrange patient to go to Dr. Almodovar's office for scleral contact lens fitting for comfort OD.   - GTTS: pred forte qd- bid; check IOP every exam  - 8/2/22 provided patient with information to Dr. Almodovar clinic. Start Michael 128 OD TID. B scan w/o masses OD  - 2/2/23 first time documented NLP (MD checked). Still very painful. Will attempt Atropine and PF but low chance of it helping. Discussed scleral CL versus evaluation for evisceration/enucleation. Patient would like to think about it.    2. ERM, OD  - Previously imaged on Mac OCT in January 2019  - Mild-to-moderate in severity per Dr. Bowles in June 2019  - Stable thickening on OCT Mac 9/2/20, no IRF/SRF seen  - Likely visually significant given NIKIA results 9/2/20 (20/150) in addition to #1  - CTM    3. Glaucoma  suspect, OU  4. ANGLE closure suspect   - Previous C/D ratio documented at 0.7 OS. However 8/2/22 C/D appears to be closer to 0.4 and p/s.  - Last //582  - Gonio 9/2/20: unable to 2/2 edema OD, but angle appears narrow temporally via Von Herick estimation//open to  OS  - OCT RNFL 4/2022: OS- all green  - IOP wnl OS  - Low suspicion for glaucoma OS at this time. Can monitor with annual DFE and OCT rnfl. If next RNFL all green, can stop annual RNFL.    5. PCIOL OS s/p yag cap  - VA 20/25 in clinic on 8/24/21  - Continue to monitor    6. H/o retinal detachment, OD  - S/p PPV/lensectomy/IOL removal/EL/AFx/C3F8 16% for dislocated sulcus IOL with retinal detachment OD    7. Monocular precautions   - poly carb given 4/22      RTC 3-4 mo DFE, OCT rnfl OS, discuss tx options OD

## 2023-04-17 ENCOUNTER — LAB VISIT (OUTPATIENT)
Dept: LAB | Facility: HOSPITAL | Age: 68
End: 2023-04-17
Attending: NURSE PRACTITIONER
Payer: MEDICARE

## 2023-04-17 DIAGNOSIS — Z86.39 PERSONAL HISTORY OF NUTRITIONAL DEFICIENCY: Primary | ICD-10-CM

## 2023-04-17 LAB — POTASSIUM SERPL-SCNC: 6.2 MMOL/L (ref 3.5–5.1)

## 2023-04-17 PROCEDURE — 84132 ASSAY OF SERUM POTASSIUM: CPT

## 2023-04-17 PROCEDURE — 36415 COLL VENOUS BLD VENIPUNCTURE: CPT

## 2023-04-20 ENCOUNTER — HOSPITAL ENCOUNTER (EMERGENCY)
Facility: HOSPITAL | Age: 68
Discharge: HOME | End: 2023-04-20
Attending: INTERNAL MEDICINE
Payer: MEDICARE

## 2023-04-20 VITALS
TEMPERATURE: 97 F | SYSTOLIC BLOOD PRESSURE: 145 MMHG | OXYGEN SATURATION: 100 % | WEIGHT: 149.94 LBS | DIASTOLIC BLOOD PRESSURE: 88 MMHG | RESPIRATION RATE: 20 BRPM | HEART RATE: 89 BPM | BODY MASS INDEX: 22.21 KG/M2 | HEIGHT: 69 IN

## 2023-04-20 DIAGNOSIS — E87.5 POTASSIUM EXCESS: ICD-10-CM

## 2023-04-20 DIAGNOSIS — R89.9 ABNORMAL LABORATORY TEST: Primary | ICD-10-CM

## 2023-04-20 LAB
ANION GAP SERPL CALC-SCNC: 7 MEQ/L
BUN SERPL-MCNC: 14.9 MG/DL (ref 8.4–25.7)
CALCIUM SERPL-MCNC: 9.5 MG/DL (ref 8.8–10)
CHLORIDE SERPL-SCNC: 106 MMOL/L (ref 98–107)
CO2 SERPL-SCNC: 25 MMOL/L (ref 23–31)
CREAT SERPL-MCNC: 0.92 MG/DL (ref 0.73–1.18)
CREAT/UREA NIT SERPL: 16
GFR SERPLBLD CREATININE-BSD FMLA CKD-EPI: >60 MLS/MIN/1.73/M2
GLUCOSE SERPL-MCNC: 93 MG/DL (ref 82–115)
POTASSIUM SERPL-SCNC: 4.5 MMOL/L (ref 3.5–5.1)
SODIUM SERPL-SCNC: 138 MMOL/L (ref 136–145)

## 2023-04-20 PROCEDURE — 80048 BASIC METABOLIC PNL TOTAL CA: CPT | Performed by: INTERNAL MEDICINE

## 2023-04-20 PROCEDURE — 93005 ELECTROCARDIOGRAM TRACING: CPT

## 2023-04-20 PROCEDURE — 99284 EMERGENCY DEPT VISIT MOD MDM: CPT

## 2023-04-21 NOTE — ED PROVIDER NOTES
Encounter Date: 4/20/2023       History     Chief Complaint   Patient presents with    Hyperkalemia     Called by [facility] today and told potassium was elevated from Monday labs. Pt denies any c/o.      Presents after a call from his cardiologist regarding elevated potassium level from labs on Monday. Pt states no renal problems or elevated potassium before. States was at [facility] due to palpitations and Holter monitor results where they draw blood. States his heart monitor results were normal    The history is provided by the patient.   Review of patient's allergies indicates:  No Known Allergies  Past Medical History:   Diagnosis Date    Glaucoma      Past Surgical History:   Procedure Laterality Date    CATARACT EXTRACTION      CORNEAL TRANSPLANT       No family history on file.  Social History     Tobacco Use    Smoking status: Every Day     Packs/day: 0.50     Types: Cigarettes    Smokeless tobacco: Never   Substance Use Topics    Alcohol use: Never    Drug use: Never     Review of Systems   Constitutional:  Negative for fever.   HENT:  Negative for sore throat.    Respiratory:  Negative for shortness of breath.    Cardiovascular:  Positive for palpitations. Negative for chest pain.   Gastrointestinal:  Negative for nausea.   Genitourinary:  Negative for dysuria.   Musculoskeletal:  Negative for back pain.   Skin:  Negative for rash.   Neurological:  Negative for weakness.   Hematological:  Does not bruise/bleed easily.   All other systems reviewed and are negative.    Physical Exam     Initial Vitals [04/20/23 2045]   BP Pulse Resp Temp SpO2   (!) 145/88 89 20 97.2 °F (36.2 °C) 100 %      MAP       --         Physical Exam    Nursing note and vitals reviewed.  Constitutional: He appears well-developed and well-nourished. No distress.   HENT:   Head: Normocephalic and atraumatic.   Eyes: Pupils are equal, round, and reactive to light.   Neck: Neck supple. No JVD present.   Normal range of motion.  Cardiovascular:  Normal  rate, regular rhythm, normal heart sounds and intact distal pulses.           Pulmonary/Chest: Breath sounds normal. No respiratory distress.   Abdominal: Abdomen is soft. Bowel sounds are normal. He exhibits no distension. There is no abdominal tenderness. There is no rebound and no guarding.   Musculoskeletal:         General: No edema. Normal range of motion.      Cervical back: Normal range of motion and neck supple.     Neurological: He is alert and oriented to person, place, and time. He has normal strength. GCS score is 15. GCS eye subscore is 4. GCS verbal subscore is 5. GCS motor subscore is 6.   Skin: Skin is warm and dry. No rash noted.   Psychiatric: His behavior is normal.       ED Course   Procedures  Labs Reviewed   BASIC METABOLIC PANEL   EXTRA TUBES    Narrative:     The following orders were created for panel order EXTRA TUBES.  Procedure                               Abnormality         Status                     ---------                               -----------         ------                     Light Blue Top Hold[[id number]]                              In process                 Lavender Top Hold[[id number]]                                In process                 Gold Top Hold[[id number]]                                    In process                   Please view results for these tests on the individual orders.   LIGHT BLUE TOP HOLD   LAVENDER TOP HOLD   GOLD TOP HOLD     EKG Readings: (Independently Interpreted)   Initial Reading: No STEMI. Rhythm: Normal Sinus Rhythm. Heart Rate: 72. Ectopy: No Ectopy. Conduction: Normal. ST Segments: Normal ST Segments. T Waves: Normal. Clinical Impression: Normal Sinus Rhythm     Imaging Results    None          Medications - No data to display                           Clinical Impression:   Final diagnoses:  [E87.5] Potassium excess  [R89.9] Abnormal laboratory test (Primary)        ED Disposition Condition    Discharge Stable          ED Prescriptions     None       Follow-up Information       Follow up With Specialties Details Why Contact Info    Rome Reid, DO Internal Medicine In 2 weeks  [address] [facility] [address] [phone]      [facility] Emergency Medicine  If symptoms worsen [address] [facility] [address] [phone]             Surinder Leroy MD  04/20/23 2139

## 2023-06-06 ENCOUNTER — OFFICE VISIT (OUTPATIENT)
Dept: OPHTHALMOLOGY | Facility: CLINIC | Age: 68
End: 2023-06-06
Payer: MEDICARE

## 2023-06-06 VITALS — WEIGHT: 149.94 LBS | BODY MASS INDEX: 22.21 KG/M2 | HEIGHT: 69 IN

## 2023-06-06 DIAGNOSIS — H40.023 AT HIGH RISK FOR OPEN ANGLE GLAUCOMA OF BOTH EYES: ICD-10-CM

## 2023-06-06 DIAGNOSIS — Z96.1 PSEUDOPHAKIA: ICD-10-CM

## 2023-06-06 DIAGNOSIS — Z86.69 HISTORY OF RETINAL DETACHMENT: ICD-10-CM

## 2023-06-06 DIAGNOSIS — H18.11 BULLOUS KERATOPATHY OF RIGHT EYE: Primary | ICD-10-CM

## 2023-06-06 DIAGNOSIS — H35.371 MACULAR PUCKER, RIGHT: ICD-10-CM

## 2023-06-06 PROCEDURE — 92134 CPTRZ OPH DX IMG PST SGM RTA: CPT | Mod: PBBFAC,PO | Performed by: OPHTHALMOLOGY

## 2023-06-06 PROCEDURE — 92133 CPTRZD OPH DX IMG PST SGM ON: CPT | Mod: PBBFAC,PO | Performed by: OPHTHALMOLOGY

## 2023-06-06 PROCEDURE — 99213 OFFICE O/P EST LOW 20 MIN: CPT | Mod: PBBFAC,PO | Performed by: STUDENT IN AN ORGANIZED HEALTH CARE EDUCATION/TRAINING PROGRAM

## 2023-06-06 PROCEDURE — 92133 CPTRZD OPH DX IMG PST SGM ON: CPT | Mod: PBBFAC,PO | Performed by: STUDENT IN AN ORGANIZED HEALTH CARE EDUCATION/TRAINING PROGRAM

## 2023-06-06 PROCEDURE — 92134 CPTRZ OPH DX IMG PST SGM RTA: CPT | Mod: PBBFAC,PO | Performed by: STUDENT IN AN ORGANIZED HEALTH CARE EDUCATION/TRAINING PROGRAM

## 2023-06-06 RX ORDER — ATROPINE SULFATE 10 MG/ML
1 SOLUTION/ DROPS OPHTHALMIC 3 TIMES DAILY
Qty: 15 ML | Refills: 3 | Status: SHIPPED | OUTPATIENT
Start: 2023-06-06 | End: 2023-08-22 | Stop reason: SDUPTHER

## 2023-06-06 RX ORDER — PREDNISOLONE ACETATE 10 MG/ML
1 SUSPENSION/ DROPS OPHTHALMIC EVERY 4 HOURS
Qty: 5 ML | Refills: 0 | Status: SHIPPED | OUTPATIENT
Start: 2023-06-06 | End: 2023-08-22 | Stop reason: SDUPTHER

## 2023-06-06 NOTE — PROGRESS NOTES
HPI    RTC 3-4 mo DFE, OCT rnfl OS, discuss tx options OD  Patient is having increased pain in OD  Last edited by Lizbeth Gonzalez MA on 6/6/2023  1:03 PM.            Assessment /Plan     For exam results, see Encounter Report.    Bullous keratopathy of right eye    History of retinal detachment    Pseudophakia        OCTN 6/2023 OS: 90 all green       OCTM 6/2023 good contour, no srf irf oS     8/2022 b scan OD: no masses     1. Microcystic corneal edema/bullous keratopathy  s/p MULTIPLE SURGERIES: most recently repeat DSAEK and IOL 5/2021  - Likely 2/2 h/o multiple surgeries (complex CE, RD repair, secondary IOL) including hx of an iris-sutured IOL  - s/p DSAEK OD (11/1/18); intraop discovered that sutured IOL had been sutured into superior cornea -> graft intentionally decentered inferiorly, could not reconstruct superior anterior chamber without cutting IOL sutures  - Graft became detached POW1 --> went to OR for revision 11/15/18  - BCVA after DSAEK documented 5/2019 @ 20/100 (20/400 prior to DSAEK)  - s/p 5/2021: repeat DSAEK and IOL- - had repeat DSAEK and IOL 5/2021, graft was detached postop, IOL is decentered  TESTING HISTORY  - Topography done May 2020, noted to have irregular astigmatism  - PLAN BY Dr. Almodovar 10/29/21 - IOP 22 OD. No intervention recommended. Patient with chronic ocular surface pain. May benefit from a procedure to improve comfort. Recommend comfort care in right eye and continued monitoring of left eye. Will try to arrange patient to go to Dr. Almodovar's office for scleral contact lens fitting for comfort OD.   - 8/2/22 provided patient with information to Dr. Almodovar clinic. Start Michael 128 OD TID. B scan w/o masses OD  - 2/2/23 first time documented NLP (MD checked). Still very painful. Will attempt Atropine and PF but low chance of it helping. Discussed scleral CL versus evaluation for evisceration/enucleation. Patient would like to think about it.    2. ERM, OD  - Previously imaged  on Mac OCT in January 2019  - Mild-to-moderate in severity per Dr. Bowles in June 2019  - Stable thickening on OCT Mac 9/2/20, no IRF/SRF seen  - Likely visually significant given NIKIA results 9/2/20 (20/150) in addition to #1  - CTM    3. Glaucoma suspect, OU  4. ANGLE closure suspect   - Previous C/D ratio documented at 0.7 OS. However 8/2/22 C/D appears to be closer to 0.4 and p/s.  - Last //582  - Gonio 9/2/20: unable to 2/2 edema OD, but angle appears narrow temporally via Von Herick estimation//open to  OS  - OCT RNFL 6/2023 all green OS, small cup on dilated exam- can stop further octn at this time   - IOP wnl OS  - Low suspicion for glaucoma OS at this time. Can monitor with annual DFE now    5. PCIOL OS s/p yag cap  - VA 20/25 in clinic on 8/24/21  - Continue to monitor    6. H/o retinal detachment, OD  - S/p PPV/lensectomy/IOL removal/EL/AFx/C3F8 16% for dislocated sulcus IOL with retinal detachment OD    7. Monocular precautions   - poly carb given 4/22        RTC 2 month symptom check

## 2023-06-08 DIAGNOSIS — M48.061 SPINAL STENOSIS, LUMBAR REGION, WITHOUT NEUROGENIC CLAUDICATION: Primary | ICD-10-CM

## 2023-06-12 DIAGNOSIS — M48.061 SPINAL STENOSIS, LUMBAR REGION, WITHOUT NEUROGENIC CLAUDICATION: Primary | ICD-10-CM

## 2023-06-30 ENCOUNTER — HOSPITAL ENCOUNTER (OUTPATIENT)
Dept: RADIOLOGY | Facility: HOSPITAL | Age: 68
Discharge: HOME | End: 2023-06-30
Attending: NEUROLOGICAL SURGERY
Payer: MEDICARE

## 2023-06-30 VITALS
RESPIRATION RATE: 18 BRPM | BODY MASS INDEX: 21.49 KG/M2 | HEART RATE: 48 BPM | HEIGHT: 69 IN | SYSTOLIC BLOOD PRESSURE: 127 MMHG | DIASTOLIC BLOOD PRESSURE: 74 MMHG | TEMPERATURE: 98 F | WEIGHT: 145.06 LBS | OXYGEN SATURATION: 98 %

## 2023-06-30 DIAGNOSIS — M48.061 SPINAL STENOSIS, LUMBAR REGION, WITHOUT NEUROGENIC CLAUDICATION: ICD-10-CM

## 2023-06-30 LAB
POC PTINR: 1.2 (ref 0.9–1.2)
POC PTWBT: 14 SEC (ref 9.7–14.3)
SAMPLE: NORMAL

## 2023-06-30 PROCEDURE — 25000003 PHARM REV CODE 250: Performed by: RADIOLOGY

## 2023-06-30 PROCEDURE — 25500020 PHARM REV CODE 255: Performed by: NEUROLOGICAL SURGERY

## 2023-06-30 PROCEDURE — 72132 CT LUMBAR SPINE W/DYE: CPT | Mod: TC

## 2023-06-30 RX ORDER — DIAZEPAM 5 MG/1
10 TABLET ORAL
Status: COMPLETED | OUTPATIENT
Start: 2023-06-30 | End: 2023-06-30

## 2023-06-30 RX ORDER — LIDOCAINE AND PRILOCAINE 25; 25 MG/G; MG/G
CREAM TOPICAL
Status: COMPLETED | OUTPATIENT
Start: 2023-06-30 | End: 2023-06-30

## 2023-06-30 RX ADMIN — IOHEXOL 9 ML: 240 INJECTION, SOLUTION INTRATHECAL; INTRAVASCULAR; INTRAVENOUS; ORAL at 09:06

## 2023-06-30 RX ADMIN — LIDOCAINE AND PRILOCAINE: 25; 25 CREAM TOPICAL at 08:06

## 2023-06-30 RX ADMIN — DIAZEPAM 10 MG: 5 TABLET ORAL at 08:06

## 2023-08-22 ENCOUNTER — OFFICE VISIT (OUTPATIENT)
Dept: OPHTHALMOLOGY | Facility: CLINIC | Age: 68
End: 2023-08-22
Payer: MEDICARE

## 2023-08-22 VITALS — HEIGHT: 69 IN | WEIGHT: 145.06 LBS | BODY MASS INDEX: 21.49 KG/M2

## 2023-08-22 DIAGNOSIS — H18.11 BULLOUS KERATOPATHY OF RIGHT EYE: Primary | ICD-10-CM

## 2023-08-22 PROCEDURE — 99213 OFFICE O/P EST LOW 20 MIN: CPT | Mod: PBBFAC,PO | Performed by: STUDENT IN AN ORGANIZED HEALTH CARE EDUCATION/TRAINING PROGRAM

## 2023-08-22 RX ORDER — PREDNISOLONE ACETATE 10 MG/ML
1 SUSPENSION/ DROPS OPHTHALMIC 4 TIMES DAILY
Qty: 5 ML | Refills: 5 | Status: SHIPPED | OUTPATIENT
Start: 2023-08-22 | End: 2023-10-27 | Stop reason: SDUPTHER

## 2023-08-22 RX ORDER — SODIUM CHLORIDE 50 MG/ML
1 SOLUTION/ DROPS OPHTHALMIC EVERY 4 HOURS PRN
Qty: 15 ML | Refills: 1 | Status: SHIPPED | OUTPATIENT
Start: 2023-08-22 | End: 2024-08-21

## 2023-08-22 RX ORDER — AMMONIUM LACTATE 12 G/100G
LOTION TOPICAL 2 TIMES DAILY
COMMUNITY
Start: 2023-06-25

## 2023-08-22 RX ORDER — TEMAZEPAM 30 MG/1
CAPSULE ORAL
COMMUNITY
Start: 2023-03-31

## 2023-08-22 RX ORDER — MIRTAZAPINE 15 MG/1
15 TABLET, FILM COATED ORAL NIGHTLY
COMMUNITY
Start: 2023-08-03

## 2023-08-22 RX ORDER — ATROPINE SULFATE 10 MG/ML
1 SOLUTION/ DROPS OPHTHALMIC DAILY
Qty: 15 ML | Refills: 5 | Status: SHIPPED | OUTPATIENT
Start: 2023-08-22 | End: 2023-10-27 | Stop reason: SDUPTHER

## 2023-08-22 RX ORDER — HYDROXYZINE PAMOATE 25 MG/1
25-50 CAPSULE ORAL NIGHTLY PRN
COMMUNITY
Start: 2023-08-03

## 2023-08-22 RX ORDER — TIMOLOL MALEATE 5 MG/ML
1 SOLUTION/ DROPS OPHTHALMIC 2 TIMES DAILY
Qty: 15 ML | Refills: 1 | Status: SHIPPED | OUTPATIENT
Start: 2023-08-22 | End: 2023-10-27 | Stop reason: SDUPTHER

## 2023-08-22 NOTE — PROGRESS NOTES
HPI    3-4 mo DFE, OCT rnfl OS, discuss tx options OD  Having pain in OD  Last edited by Lizbeth Gonzalez MA on 8/22/2023 10:11 AM.            Assessment /Plan     For exam results, see Encounter Report.    There are no diagnoses linked to this encounter.      OCTN 6/2023 OS: 90 all green       OCTM 6/2023 good contour, no srf irf oS     8/2022 b scan OD: no masses     1. Microcystic corneal edema/bullous keratopathy  s/p MULTIPLE SURGERIES: most recently repeat DSAEK and IOL 5/2021  - Likely 2/2 h/o multiple surgeries (complex CE, RD repair, secondary IOL) including hx of an iris-sutured IOL  - s/p DSAEK OD (11/1/18); intraop discovered that sutured IOL had been sutured into superior cornea -> graft intentionally decentered inferiorly, could not reconstruct superior anterior chamber without cutting IOL sutures  - Graft became detached POW1 --> went to OR for revision 11/15/18  - BCVA after DSAEK documented 5/2019 @ 20/100 (20/400 prior to DSAEK)  - s/p 5/2021: repeat DSAEK and IOL- - had repeat DSAEK and IOL 5/2021, graft was detached postop, IOL is decentered  TESTING HISTORY  - Topography done May 2020, noted to have irregular astigmatism  - PLAN BY Dr. Almodovar 10/29/21 - IOP 22 OD. No intervention recommended. Patient with chronic ocular surface pain. May benefit from a procedure to improve comfort. Recommend comfort care in right eye and continued monitoring of left eye. Will try to arrange patient to go to Dr. Almodovar's office for scleral contact lens fitting for comfort OD.   - 8/2/22 provided patient with information to Dr. Almodovar clinic. Start Aileen 128 OD TID. B scan w/o masses OD  - 8/22/23: NLP, painful. Patient not wanting enucleation at this time. Restart aileen OD TID, start timolol qam, continue atropine daily, PF QID.    2. ERM, OD  - Previously imaged on Mac OCT in January 2019  - Mild-to-moderate in severity per Dr. Bowles in June 2019  - Stable thickening on OCT Mac 9/2/20, no IRF/SRF seen  - Likely  visually significant given NIKIA results 9/2/20 (20/150) in addition to #1  - CTM    3. Glaucoma suspect, OU  4. ANGLE closure suspect   - Previous C/D ratio documented at 0.7 OS. However 8/2/22 C/D appears to be closer to 0.4 and p/s.  - Last //582  - Gonio 9/2/20: unable to 2/2 edema OD, but angle appears narrow temporally via Von Herick estimation//open to  OS  - OCT RNFL 6/2023 all green OS, small cup on dilated exam- can stop further octn at this time   - IOP wnl OS  - Low suspicion for glaucoma OS at this time. Can monitor with annual DFE now    5. PCIOL OS s/p yag cap  - VA 20/25 in clinic on 8/24/21  - Continue to monitor    6. H/o retinal detachment, OD  - S/p PPV/lensectomy/IOL removal/EL/AFx/C3F8 16% for dislocated sulcus IOL with retinal detachment OD    7. Monocular precautions   - poly carb given 8/22/23        RTC 2 month k check, annual Bscan next visit

## 2023-09-22 ENCOUNTER — OFFICE VISIT (OUTPATIENT)
Dept: OPHTHALMOLOGY | Facility: CLINIC | Age: 68
End: 2023-09-22
Payer: MEDICARE

## 2023-09-22 VITALS — WEIGHT: 145.06 LBS | HEIGHT: 69 IN | BODY MASS INDEX: 21.49 KG/M2

## 2023-09-22 DIAGNOSIS — Z96.1 PSEUDOPHAKIA: Primary | ICD-10-CM

## 2023-09-22 PROCEDURE — 99213 OFFICE O/P EST LOW 20 MIN: CPT | Mod: PBBFAC,PO | Performed by: STUDENT IN AN ORGANIZED HEALTH CARE EDUCATION/TRAINING PROGRAM

## 2023-09-22 RX ORDER — AMOXICILLIN 500 MG/1
1 CAPSULE ORAL
COMMUNITY

## 2023-09-22 RX ORDER — HYDROCODONE BITARTRATE AND ACETAMINOPHEN 5; 325 MG/1; MG/1
TABLET ORAL
COMMUNITY

## 2023-09-22 RX ORDER — ACETAMINOPHEN AND CODEINE PHOSPHATE 300; 30 MG/1; MG/1
TABLET ORAL
COMMUNITY

## 2023-09-22 NOTE — PROGRESS NOTES
HPI     Bullous keratopathy     Additional comments: Cornea check, annual Bscan next visit           Comments    Bullous keratopathy          Last edited by Claribel Nye MA on 9/22/2023 11:51 AM.          HPI     Bullous keratopathy     Additional comments: Cornea check, annual Bscan next visit           Comments    Bullous keratopathy          Last edited by Claribel Nye MA on 9/22/2023 11:51 AM.            Assessment /Plan     For exam results, see Encounter Report.    There are no diagnoses linked to this encounter.      OCTN 6/2023 OS: 90 all green       OCTM 6/2023 good contour, no srf irf oS     8/2022 b scan OD: no masses     1. Microcystic corneal edema/bullous keratopathy OD  s/p MULTIPLE SURGERIES: most recently repeat DSAEK and IOL 5/2021  - Likely 2/2 h/o multiple surgeries (complex CE, RD repair, secondary IOL) including hx of an iris-sutured IOL  - s/p DSAEK OD (11/1/18); intraop discovered that sutured IOL had been sutured into superior cornea -> graft intentionally decentered inferiorly, could not reconstruct superior anterior chamber without cutting IOL sutures  - Graft became detached POW1 --> went to OR for revision 11/15/18  - BCVA after DSAEK documented 5/2019 @ 20/100 (20/400 prior to DSAEK)  - s/p 5/2021: repeat DSAEK and IOL- - had repeat DSAEK and IOL 5/2021, graft was detached postop, IOL is decentered  TESTING HISTORY  - Topography done May 2020, noted to have irregular astigmatism  - PLAN BY Dr. Almodovar 10/29/21 - IOP 22 OD. No intervention recommended. Patient with chronic ocular surface pain. May benefit from a procedure to improve comfort. Recommend comfort care in right eye and continued monitoring of left eye. Will try to arrange patient to go to Dr. Almodovar's office for scleral contact lens fitting for comfort OD.   - 8/2/22 provided patient with information to Dr. Almodovar clinic. Start Michael 128 OD TID. B scan w/o masses OD  - 8/22/23: NLP, painful. Patient not wanting  enucleation at this time. Restart aileen OD TID, start timolol qam, continue atropine daily, PF QID.    2. ERM, OD  - Previously imaged on Mac OCT in January 2019  - Mild-to-moderate in severity per Dr. Bowles in June 2019  - Stable thickening on OCT Mac 9/2/20, no IRF/SRF seen  - Likely visually significant given NIKIA results 9/2/20 (20/150) in addition to #1  - CTM    3. Glaucoma suspect, OU  4. ANGLE closure suspect   - Previous C/D ratio documented at 0.7 OS. However 8/2/22 C/D appears to be closer to 0.4 and p/s.  - Last //582  - Gonio 9/2/20: unable to 2/2 edema OD, but angle appears narrow temporally via Von Herick estimation//open to  OS  - OCT RNFL 6/2023 all green OS, small cup on dilated exam- can stop further octn at this time   - IOP wnl OS  - Low suspicion for glaucoma OS at this time. Can monitor with annual DFE now    5. PCIOL OS s/p yag cap  - VA 20/25 in clinic on 8/24/21  - fixed Mrx 09/2023  - Continue to monitor    6. H/o retinal detachment, OD  - S/p PPV/lensectomy/IOL removal/EL/AFx/C3F8 16% for dislocated sulcus IOL with retinal detachment OD    7. Monocular precautions   - poly carb given 8/22/23        RTC as scheduled 1 month k check AND BSCAN

## 2023-10-20 ENCOUNTER — HOSPITAL ENCOUNTER (EMERGENCY)
Facility: HOSPITAL | Age: 68
Discharge: LEFT BEFORE DISCHARGE | End: 2023-10-20
Attending: STUDENT IN AN ORGANIZED HEALTH CARE EDUCATION/TRAINING PROGRAM
Payer: MEDICARE

## 2023-10-20 VITALS
OXYGEN SATURATION: 100 % | HEIGHT: 69 IN | DIASTOLIC BLOOD PRESSURE: 85 MMHG | BODY MASS INDEX: 21.55 KG/M2 | TEMPERATURE: 98 F | WEIGHT: 145.5 LBS | RESPIRATION RATE: 18 BRPM | SYSTOLIC BLOOD PRESSURE: 142 MMHG | HEART RATE: 74 BPM

## 2023-10-20 DIAGNOSIS — E87.5 POTASSIUM (K) EXCESS: ICD-10-CM

## 2023-10-20 DIAGNOSIS — R00.2 PALPITATIONS: Primary | ICD-10-CM

## 2023-10-20 LAB
ALBUMIN SERPL-MCNC: 3.9 G/DL (ref 3.4–4.8)
ALBUMIN/GLOB SERPL: 1 RATIO (ref 1.1–2)
ALP SERPL-CCNC: 52 UNIT/L (ref 40–150)
ALT SERPL-CCNC: 10 UNIT/L (ref 0–55)
AST SERPL-CCNC: 21 UNIT/L (ref 5–34)
BASOPHILS # BLD AUTO: 0.04 X10(3)/MCL
BASOPHILS NFR BLD AUTO: 0.8 %
BILIRUB SERPL-MCNC: 0.5 MG/DL
BUN SERPL-MCNC: 10.1 MG/DL (ref 8.4–25.7)
CALCIUM SERPL-MCNC: 10 MG/DL (ref 8.8–10)
CHLORIDE SERPL-SCNC: 109 MMOL/L (ref 98–107)
CO2 SERPL-SCNC: 26 MMOL/L (ref 23–31)
CREAT SERPL-MCNC: 0.86 MG/DL (ref 0.73–1.18)
EOSINOPHIL # BLD AUTO: 0.11 X10(3)/MCL (ref 0–0.9)
EOSINOPHIL NFR BLD AUTO: 2.2 %
ERYTHROCYTE [DISTWIDTH] IN BLOOD BY AUTOMATED COUNT: 14.9 % (ref 11.5–17)
GFR SERPLBLD CREATININE-BSD FMLA CKD-EPI: >60 MLS/MIN/1.73/M2
GLOBULIN SER-MCNC: 4.1 GM/DL (ref 2.4–3.5)
GLUCOSE SERPL-MCNC: 77 MG/DL (ref 82–115)
HCT VFR BLD AUTO: 40.3 % (ref 42–52)
HGB BLD-MCNC: 13.2 G/DL (ref 14–18)
IMM GRANULOCYTES # BLD AUTO: 0 X10(3)/MCL (ref 0–0.04)
IMM GRANULOCYTES NFR BLD AUTO: 0 %
LYMPHOCYTES # BLD AUTO: 2.81 X10(3)/MCL (ref 0.6–4.6)
LYMPHOCYTES NFR BLD AUTO: 57.3 %
MAGNESIUM SERPL-MCNC: 2.1 MG/DL (ref 1.6–2.6)
MCH RBC QN AUTO: 28.7 PG (ref 27–31)
MCHC RBC AUTO-ENTMCNC: 32.8 G/DL (ref 33–36)
MCV RBC AUTO: 87.6 FL (ref 80–94)
MONOCYTES # BLD AUTO: 0.33 X10(3)/MCL (ref 0.1–1.3)
MONOCYTES NFR BLD AUTO: 6.7 %
NEUTROPHILS # BLD AUTO: 1.61 X10(3)/MCL (ref 2.1–9.2)
NEUTROPHILS NFR BLD AUTO: 33 %
NRBC BLD AUTO-RTO: 0 %
PLATELET # BLD AUTO: 240 X10(3)/MCL (ref 130–400)
PMV BLD AUTO: 10.9 FL (ref 7.4–10.4)
POTASSIUM SERPL-SCNC: 5.2 MMOL/L (ref 3.5–5.1)
PROT SERPL-MCNC: 8 GM/DL (ref 5.8–7.6)
RBC # BLD AUTO: 4.6 X10(6)/MCL (ref 4.7–6.1)
SODIUM SERPL-SCNC: 142 MMOL/L (ref 136–145)
TROPONIN I SERPL-MCNC: 0.02 NG/ML (ref 0–0.04)
TSH SERPL-ACNC: 1.17 UIU/ML (ref 0.35–4.94)
WBC # SPEC AUTO: 4.9 X10(3)/MCL (ref 4.5–11.5)

## 2023-10-20 PROCEDURE — 84443 ASSAY THYROID STIM HORMONE: CPT | Performed by: PHYSICIAN ASSISTANT

## 2023-10-20 PROCEDURE — 99284 EMERGENCY DEPT VISIT MOD MDM: CPT

## 2023-10-20 PROCEDURE — 80053 COMPREHEN METABOLIC PANEL: CPT | Performed by: PHYSICIAN ASSISTANT

## 2023-10-20 PROCEDURE — 93005 ELECTROCARDIOGRAM TRACING: CPT

## 2023-10-20 PROCEDURE — 85025 COMPLETE CBC W/AUTO DIFF WBC: CPT | Performed by: PHYSICIAN ASSISTANT

## 2023-10-20 PROCEDURE — 84484 ASSAY OF TROPONIN QUANT: CPT | Performed by: PHYSICIAN ASSISTANT

## 2023-10-20 PROCEDURE — 83735 ASSAY OF MAGNESIUM: CPT | Performed by: PHYSICIAN ASSISTANT

## 2023-10-20 NOTE — ED PROVIDER NOTES
Encounter Date: 10/20/2023       History     Chief Complaint   Patient presents with    Palpitations     States had 2 episodes last night where he was awaken by the feeling that his heart was racing , currently hr 64     67 YO AAM in ER with complaints of 2 episodes of palpitations last night while he was sleeping that woke him up from sleep. Hx of same in past and is followed by [facility]. States he had a holter  monitor done about 6 months ago that was normal. Denies fever, chills, chest pain, SOB, abdominal pain, N/V/D, HA or dizziness. No other complaints.     The history is provided by the patient.     Review of patient's allergies indicates:  No Known Allergies  Past Medical History:   Diagnosis Date    Glaucoma      Past Surgical History:   Procedure Laterality Date    CATARACT EXTRACTION      CATARACT EXTRACTION      CORNEAL TRANSPLANT       No family history on file.  Social History     Tobacco Use    Smoking status: Every Day     Current packs/day: 0.50     Types: Cigarettes    Smokeless tobacco: Never   Substance Use Topics    Alcohol use: Never    Drug use: Not Currently     Review of Systems   Constitutional:  Negative for chills and fever.   HENT:  Negative for congestion and trouble swallowing.    Respiratory:  Negative for chest tightness, shortness of breath and wheezing.    Cardiovascular:  Positive for palpitations. Negative for chest pain and leg swelling.   Gastrointestinal:  Negative for abdominal pain, diarrhea, nausea and vomiting.   Musculoskeletal:  Negative for joint swelling.   Skin:  Negative for rash.   Neurological:  Negative for dizziness, weakness and headaches.   All other systems reviewed and are negative.      Physical Exam     Initial Vitals [10/20/23 1216]   BP Pulse Resp Temp SpO2   (!) 142/85 74 18 97.9 °F (36.6 °C) 100 %      MAP       --         Physical Exam    Nursing note and vitals reviewed.  Constitutional: He appears well-developed and well-nourished.   HENT:   Head:  Normocephalic and atraumatic.   Nose: Nose normal.   Eyes: Conjunctivae are normal.   Neck: Neck supple.   Cardiovascular:  Normal rate, regular rhythm and intact distal pulses.           Pulmonary/Chest: Breath sounds normal.   Musculoskeletal:      Cervical back: Neck supple.     Neurological: He is alert and oriented to person, place, and time.   Skin: Skin is dry.   Psychiatric: He has a normal mood and affect.         ED Course   Procedures  Labs Reviewed   COMPREHENSIVE METABOLIC PANEL - Abnormal; Notable for the following components:       Result Value    Potassium Level 5.2 (*)     Chloride 109 (*)     Glucose Level 77 (*)     Protein Total 8.0 (*)     Globulin 4.1 (*)     Albumin/Globulin Ratio 1.0 (*)     All other components within normal limits   CBC WITH DIFFERENTIAL - Abnormal; Notable for the following components:    RBC 4.60 (*)     Hgb 13.2 (*)     Hct 40.3 (*)     MCHC 32.8 (*)     MPV 10.9 (*)     Neut # 1.61 (*)     All other components within normal limits   MAGNESIUM - Normal   TSH - Normal   TROPONIN I - Normal   CBC W/ AUTO DIFFERENTIAL    Narrative:     The following orders were created for panel order CBC auto differential.  Procedure                               Abnormality         Status                     ---------                               -----------         ------                     CBC with Differential[[id number]]       Abnormal            Final result                 Please view results for these tests on the individual orders.     EKG Readings: (Independently Interpreted)   Initial Reading: No STEMI. Rhythm: Normal Sinus Rhythm. Heart Rate: 65. Ectopy: No Ectopy. ST Segments: Normal ST Segments. Clinical Impression: Normal Sinus Rhythm   1222     ECG Results              EKG 12-lead (In process)  Result time 10/20/23 15:31:25      In process by Interface, Lab In [facility] (10/20/23 15:31:25)                   Narrative:    Test Reason : R00.2,    Vent. Rate : 065 BPM      Atrial Rate : 065 BPM     P-R Int : 172 ms          QRS Dur : 082 ms      QT Int : 402 ms       P-R-T Axes : 078 062 060 degrees     QTc Int : 418 ms    Normal sinus rhythm  Early repolarization  Normal ECG  When compared with ECG of 20-APR-2023 20:52,  No significant change was found    Referred By: [email or web address]   SELF           Confirmed By:                                   Imaging Results    None          Medications - No data to display  Medical Decision Making       APC / Resident Notes:   I was not physically present during the history, exam or disposition of this patient. I was available at all times for consultation. (Jessica)        ED Course as of 10/20/23 2024   Fri Oct 20, 2023   1350 Went to exam room to give results and pt not in room, he told me he was going outside to get a jacket from his vehicle and coming back about 30 minutes ago [TT]   1417 Pt still nowhere in lobby or in exam room, considered to be eloped [TT]      ED Course User Index  [TT] Chapincito Pion PA                    Clinical Impression:   Final diagnoses:  [R00.2] Palpitations (Primary)  [E87.5] Potassium (K) excess        ED Disposition Condition    Eloped Stable                  Chapincito Pino PA  10/20/23 1359       Chapincito Pino PA  10/20/23 1417       Chapincito Pino PA  10/20/23 1731       Antonio Lopez MD  10/20/23 2024

## 2023-10-27 ENCOUNTER — OFFICE VISIT (OUTPATIENT)
Dept: OPHTHALMOLOGY | Facility: CLINIC | Age: 68
End: 2023-10-27
Payer: MEDICARE

## 2023-10-27 VITALS — WEIGHT: 145.5 LBS | HEIGHT: 69 IN | BODY MASS INDEX: 21.55 KG/M2

## 2023-10-27 DIAGNOSIS — Z86.69 HISTORY OF RETINAL DETACHMENT: Primary | ICD-10-CM

## 2023-10-27 DIAGNOSIS — H18.11 BULLOUS KERATOPATHY OF RIGHT EYE: ICD-10-CM

## 2023-10-27 DIAGNOSIS — H35.371 MACULAR PUCKER, RIGHT: ICD-10-CM

## 2023-10-27 DIAGNOSIS — H40.023 AT HIGH RISK FOR OPEN ANGLE GLAUCOMA OF BOTH EYES: ICD-10-CM

## 2023-10-27 PROCEDURE — 76512 OPH US DX B-SCAN: CPT | Mod: PBBFAC,PN | Performed by: STUDENT IN AN ORGANIZED HEALTH CARE EDUCATION/TRAINING PROGRAM

## 2023-10-27 PROCEDURE — 99213 OFFICE O/P EST LOW 20 MIN: CPT | Mod: PBBFAC,PN | Performed by: STUDENT IN AN ORGANIZED HEALTH CARE EDUCATION/TRAINING PROGRAM

## 2023-10-27 RX ORDER — PREDNISOLONE ACETATE 10 MG/ML
1 SUSPENSION/ DROPS OPHTHALMIC 4 TIMES DAILY
Qty: 5 ML | Refills: 5 | Status: SHIPPED | OUTPATIENT
Start: 2023-10-27 | End: 2024-10-26

## 2023-10-27 RX ORDER — TIMOLOL MALEATE 5 MG/ML
1 SOLUTION/ DROPS OPHTHALMIC 2 TIMES DAILY
Qty: 15 ML | Refills: 12 | Status: SHIPPED | OUTPATIENT
Start: 2023-10-27 | End: 2024-10-26

## 2023-10-27 RX ORDER — ATROPINE SULFATE 10 MG/ML
1 SOLUTION/ DROPS OPHTHALMIC DAILY
Qty: 15 ML | Refills: 5 | Status: SHIPPED | OUTPATIENT
Start: 2023-10-27

## 2023-10-27 NOTE — PROGRESS NOTES
HPI     1 month k check AND BSCAN  Last edited by Lizbeth Gonzalez MA on 10/27/2023  9:05 AM.      Assessment /Plan     For exam results, see Encounter Report.    History of retinal detachment    Bullous keratopathy of right eye  -     timolol maleate 0.5% (TIMOPTIC) 0.5 % Drop; Place 1 drop into the right eye 2 (two) times daily.  Dispense: 15 mL; Refill: 12  -     prednisoLONE acetate (PRED FORTE) 1 % DrpS; Place 1 drop into the right eye 4 (four) times daily.  Dispense: 5 mL; Refill: 5  -     atropine 1% (ISOPTO ATROPINE) 1 % Drop; Place 1 drop into both eyes once daily.  Dispense: 15 mL; Refill: 5    At high risk for open angle glaucoma of both eyes    Macular pucker, right        OCTN 6/2023 OS: 90 all green     OCTM 6/2023 good contour, no srf irf oS     8/2022 b scan OD: no masses   10/27/2023: b scan OD  no masses or tumors, retina appears flat, vitreous clear      1. Microcystic corneal edema/bullous keratopathy OD  s/p MULTIPLE SURGERIES: most recently repeat DSAEK and IOL 5/2021  - Likely 2/2 h/o multiple surgeries (complex CE, RD repair, secondary IOL) including hx of an iris-sutured IOL  - s/p DSAEK OD (11/1/18); intraop discovered that sutured IOL had been sutured into superior cornea -> graft intentionally decentered inferiorly, could not reconstruct superior anterior chamber without cutting IOL sutures  - Graft became detached POW1 --> went to OR for revision 11/15/18  - BCVA after DSAEK documented 5/2019 @ 20/100 (20/400 prior to DSAEK)  - s/p 5/2021: repeat DSAEK and IOL- - had repeat DSAEK and IOL 5/2021, graft was detached postop, IOL is decentered  TESTING HISTORY  - Topography done May 2020, noted to have irregular astigmatism  - PLAN BY Dr. Almodovar 10/29/21 - IOP 22 OD. No intervention recommended. Patient with chronic ocular surface pain. May benefit from a procedure to improve comfort. Recommend comfort care in right eye and continued monitoring of left eye. Will try to arrange patient to  go to Dr. Almodovar's office for scleral contact lens fitting for comfort OD.   - 8/2/22 provided patient with information to Dr. Almodovar clinic. Start Aileen 128 OD TID. B scan w/o masses OD  - 8/22/23: NLP, painful. Patient not wanting enucleation at this time. Restart aileen OD TID, start timolol qam, continue atropine daily, PF QID.  - 10/27/23: NLP comfortable eye, continue atropine, pf, timolol, aileen. B scan today without masses or tumors    2. ERM, OD  - Previously imaged on Mac OCT in January 2019  - Mild-to-moderate in severity per Dr. Bowles in June 2019  - Stable thickening on OCT Mac 9/2/20, no IRF/SRF seen  - Likely visually significant given NIKIA results 9/2/20 (20/150) in addition to #1  - CTM    3. Glaucoma suspect, OU  4. ANGLE closure suspect   - Previous C/D ratio documented at 0.7 OS. However 8/2/22 C/D appears to be closer to 0.4 and p/s.  - Last //582  - Gonio 9/2/20: unable to 2/2 edema OD, but angle appears narrow temporally via Von Herick estimation//open to  OS  - OCT RNFL 6/2023 all green OS, small cup on dilated exam- can stop further octn at this time   - IOP wnl OS  - Low suspicion for glaucoma OS at this time. Can monitor with annual DFE now    5. PCIOL OS s/p yag cap  - VA 20/25 in clinic on 8/24/21  - fixed Mrx 09/2023  - Continue to monitor    6. H/o retinal detachment, OD  - S/p PPV/lensectomy/IOL removal/EL/AFx/C3F8 16% for dislocated sulcus IOL with retinal detachment OD    7. Monocular precautions   - poly carb given 8/22/23        RTC 6 months DFE

## 2023-11-27 ENCOUNTER — TELEPHONE (OUTPATIENT)
Dept: OPHTHALMOLOGY | Facility: CLINIC | Age: 68
End: 2023-11-27
Payer: MEDICARE

## 2023-11-27 NOTE — TELEPHONE ENCOUNTER
Received signed documentation patient request [facility] as primary pharmacy to fill medications. Called patient to verify change in pharmacy, patient states he cancelled [facility] due to they wanted him to change his health plan. At patients request will keep [facility] as his primary pharmacy.

## 2024-02-05 ENCOUNTER — LAB VISIT (OUTPATIENT)
Dept: LAB | Facility: HOSPITAL | Age: 69
End: 2024-02-05
Attending: PAIN MEDICINE
Payer: MEDICARE

## 2024-02-05 DIAGNOSIS — R53.83 FATIGUE, UNSPECIFIED TYPE: ICD-10-CM

## 2024-02-05 DIAGNOSIS — E55.9 AVITAMINOSIS D: ICD-10-CM

## 2024-02-05 DIAGNOSIS — G60.9 IDIOPATHIC PERIPHERAL NEUROPATHY: Primary | ICD-10-CM

## 2024-02-05 DIAGNOSIS — E16.2 HYPOGLYCEMIA, UNSPECIFIED: ICD-10-CM

## 2024-02-05 DIAGNOSIS — Z79.899 ENCOUNTER FOR LONG-TERM (CURRENT) USE OF OTHER MEDICATIONS: ICD-10-CM

## 2024-02-05 LAB
ALBUMIN SERPL-MCNC: 4.2 G/DL (ref 3.4–4.8)
ALBUMIN/GLOB SERPL: 0.9 RATIO (ref 1.1–2)
ALP SERPL-CCNC: 58 UNIT/L (ref 40–150)
ALT SERPL-CCNC: 14 UNIT/L (ref 0–55)
AST SERPL-CCNC: 23 UNIT/L (ref 5–34)
BASOPHILS # BLD AUTO: 0.04 X10(3)/MCL
BASOPHILS NFR BLD AUTO: 0.9 %
BILIRUB SERPL-MCNC: 0.4 MG/DL
BUN SERPL-MCNC: 12.5 MG/DL (ref 8.4–25.7)
CALCIUM SERPL-MCNC: 9.7 MG/DL (ref 8.8–10)
CHLORIDE SERPL-SCNC: 105 MMOL/L (ref 98–107)
CK SERPL-CCNC: 421 U/L (ref 30–200)
CO2 SERPL-SCNC: 28 MMOL/L (ref 23–31)
CREAT SERPL-MCNC: 0.82 MG/DL (ref 0.73–1.18)
CRP SERPL-MCNC: 2.2 MG/L
DEPRECATED CALCIDIOL+CALCIFEROL SERPL-MC: 29.3 NG/ML (ref 30–80)
EOSINOPHIL # BLD AUTO: 0.27 X10(3)/MCL (ref 0–0.9)
EOSINOPHIL NFR BLD AUTO: 6.1 %
ERYTHROCYTE [DISTWIDTH] IN BLOOD BY AUTOMATED COUNT: 14.8 % (ref 11.5–17)
ERYTHROCYTE [SEDIMENTATION RATE] IN BLOOD: 12 MM/HR (ref 0–15)
EST. AVERAGE GLUCOSE BLD GHB EST-MCNC: 105.4 MG/DL
FOLATE SERPL-MCNC: 7.2 NG/ML (ref 7–31.4)
GFR SERPLBLD CREATININE-BSD FMLA CKD-EPI: >60 MLS/MIN/1.73/M2
GLOBULIN SER-MCNC: 4.6 GM/DL (ref 2.4–3.5)
GLUCOSE SERPL-MCNC: 87 MG/DL (ref 82–115)
HBA1C MFR BLD: 5.3 %
HCT VFR BLD AUTO: 43.2 % (ref 42–52)
HGB BLD-MCNC: 14.5 G/DL (ref 14–18)
IGA SERPL-MCNC: 174 MG/DL (ref 101–645)
IGG SERPL-MCNC: 2236 MG/DL (ref 540–1822)
IGM SERPL-MCNC: 136 MG/DL (ref 22–240)
IMM GRANULOCYTES # BLD AUTO: 0 X10(3)/MCL (ref 0–0.04)
IMM GRANULOCYTES NFR BLD AUTO: 0 %
LYMPHOCYTES # BLD AUTO: 2.51 X10(3)/MCL (ref 0.6–4.6)
LYMPHOCYTES NFR BLD AUTO: 56.5 %
MCH RBC QN AUTO: 29.4 PG (ref 27–31)
MCHC RBC AUTO-ENTMCNC: 33.6 G/DL (ref 33–36)
MCV RBC AUTO: 87.4 FL (ref 80–94)
MONOCYTES # BLD AUTO: 0.35 X10(3)/MCL (ref 0.1–1.3)
MONOCYTES NFR BLD AUTO: 7.9 %
NEUTROPHILS # BLD AUTO: 1.27 X10(3)/MCL (ref 2.1–9.2)
NEUTROPHILS NFR BLD AUTO: 28.6 %
NRBC BLD AUTO-RTO: 0 %
PLATELET # BLD AUTO: 273 X10(3)/MCL (ref 130–400)
PMV BLD AUTO: 10.1 FL (ref 7.4–10.4)
POTASSIUM SERPL-SCNC: 4.8 MMOL/L (ref 3.5–5.1)
PROT SERPL-MCNC: 8.8 GM/DL (ref 5.8–7.6)
RBC # BLD AUTO: 4.94 X10(6)/MCL (ref 4.7–6.1)
RHEUMATOID FACT SERPL-ACNC: <13 IU/ML
SODIUM SERPL-SCNC: 139 MMOL/L (ref 136–145)
T4 FREE SERPL-MCNC: 0.8 NG/DL (ref 0.7–1.48)
TSH SERPL-ACNC: 1.71 UIU/ML (ref 0.35–4.94)
VIT B12 SERPL-MCNC: 615 PG/ML (ref 213–816)
WBC # SPEC AUTO: 4.44 X10(3)/MCL (ref 4.5–11.5)

## 2024-02-05 PROCEDURE — 82746 ASSAY OF FOLIC ACID SERUM: CPT

## 2024-02-05 PROCEDURE — 80053 COMPREHEN METABOLIC PANEL: CPT

## 2024-02-05 PROCEDURE — 85025 COMPLETE CBC W/AUTO DIFF WBC: CPT

## 2024-02-05 PROCEDURE — 85652 RBC SED RATE AUTOMATED: CPT

## 2024-02-05 PROCEDURE — 86431 RHEUMATOID FACTOR QUANT: CPT

## 2024-02-05 PROCEDURE — 86039 ANTINUCLEAR ANTIBODIES (ANA): CPT

## 2024-02-05 PROCEDURE — 82607 VITAMIN B-12: CPT

## 2024-02-05 PROCEDURE — 86140 C-REACTIVE PROTEIN: CPT

## 2024-02-05 PROCEDURE — 84443 ASSAY THYROID STIM HORMONE: CPT

## 2024-02-05 PROCEDURE — 83036 HEMOGLOBIN GLYCOSYLATED A1C: CPT

## 2024-02-05 PROCEDURE — 82550 ASSAY OF CK (CPK): CPT

## 2024-02-05 PROCEDURE — 84207 ASSAY OF VITAMIN B-6: CPT

## 2024-02-05 PROCEDURE — 83521 IG LIGHT CHAINS FREE EACH: CPT

## 2024-02-05 PROCEDURE — 82784 ASSAY IGA/IGD/IGG/IGM EACH: CPT

## 2024-02-05 PROCEDURE — 84439 ASSAY OF FREE THYROXINE: CPT

## 2024-02-05 PROCEDURE — 84165 PROTEIN E-PHORESIS SERUM: CPT

## 2024-02-05 PROCEDURE — 82306 VITAMIN D 25 HYDROXY: CPT

## 2024-02-05 PROCEDURE — 36415 COLL VENOUS BLD VENIPUNCTURE: CPT

## 2024-02-06 LAB
ALBUMIN % SPEP (OHS): 45.18
ALBUMIN SERPL-MCNC: 4 G/DL (ref 3.4–4.8)
ALBUMIN/GLOB SERPL: 0.8 RATIO (ref 1.1–2)
ALPHA 1 GLOB (OHS): 0.21 GM/DL
ALPHA 1 GLOB% (OHS): 2.36
ALPHA 2 GLOB % (OHS): 11.39
ALPHA 2 GLOB (OHS): 1 GM/DL
ANA SER QL HEP2 SUBST: NORMAL
BETA GLOB (OHS): 1.23 GM/DL
BETA GLOB% (OHS): 13.98
GAMMA GLOBULIN % (OHS): 27.09
GAMMA GLOBULIN (OHS): 2.38 GM/DL
GLOBULIN SER-MCNC: 4.8 GM/DL (ref 2.4–3.5)
KAPPA LC FREE SER-MCNC: 3.78 MG/DL (ref 0.33–1.94)
KAPPA LC FREE/LAMBDA FREE SER: 1.9 {RATIO} (ref 0.26–1.65)
LAMBDA LC FREE SERPL-MCNC: 1.99 MG/DL (ref 0.57–2.63)
M SPIKE % (OHS): ABNORMAL
M SPIKE (OHS): ABNORMAL
PATH REV: NORMAL
PROT SERPL-MCNC: 8.8 GM/DL (ref 5.8–7.6)

## 2024-02-08 LAB — PYRIDOXAL PHOS SERPL-MCNC: 10 MCG/L (ref 5–50)

## 2024-04-19 ENCOUNTER — LAB VISIT (OUTPATIENT)
Dept: LAB | Facility: HOSPITAL | Age: 69
End: 2024-04-19
Attending: INTERNAL MEDICINE
Payer: MEDICARE

## 2024-04-19 DIAGNOSIS — Z12.11 SPECIAL SCREENING FOR MALIGNANT NEOPLASMS, COLON: Primary | ICD-10-CM

## 2024-04-19 LAB
HEMOCCULT SP1 STL QL: NEGATIVE
HEMOCCULT SP2 STL QL: NEGATIVE
HEMOCCULT SP3 STL QL: NEGATIVE

## 2024-04-19 PROCEDURE — 82270 OCCULT BLOOD FECES: CPT

## 2024-05-30 ENCOUNTER — OFFICE VISIT (OUTPATIENT)
Dept: OPHTHALMOLOGY | Facility: CLINIC | Age: 69
End: 2024-05-30
Payer: MEDICARE

## 2024-05-30 VITALS — WEIGHT: 145.5 LBS | BODY MASS INDEX: 21.55 KG/M2 | HEIGHT: 69 IN

## 2024-05-30 DIAGNOSIS — H40.20X0 PRIMARY ANGLE CLOSURE: ICD-10-CM

## 2024-05-30 DIAGNOSIS — H18.11 BULLOUS KERATOPATHY OF RIGHT EYE: Primary | ICD-10-CM

## 2024-05-30 DIAGNOSIS — Z86.69 HISTORY OF RETINAL DETACHMENT: ICD-10-CM

## 2024-05-30 DIAGNOSIS — Z96.1 PSEUDOPHAKIA: ICD-10-CM

## 2024-05-30 DIAGNOSIS — H40.033 NARROW ANGLE GLAUCOMA SUSPECT OF BOTH EYES: ICD-10-CM

## 2024-05-30 DIAGNOSIS — H35.373 MACULAR PUCKER OF BOTH EYES: ICD-10-CM

## 2024-05-30 PROCEDURE — 92133 CPTRZD OPH DX IMG PST SGM ON: CPT | Mod: PBBFAC,PN | Performed by: OPHTHALMOLOGY

## 2024-05-30 PROCEDURE — 99213 OFFICE O/P EST LOW 20 MIN: CPT | Mod: PBBFAC,PN

## 2024-05-30 PROCEDURE — 92133 CPTRZD OPH DX IMG PST SGM ON: CPT | Mod: PBBFAC,PN,LT,GC

## 2024-05-30 RX ORDER — ATROPINE SULFATE 10 MG/ML
1 SOLUTION/ DROPS OPHTHALMIC DAILY
Qty: 15 ML | Refills: 5 | Status: SHIPPED | OUTPATIENT
Start: 2024-05-30

## 2024-05-30 RX ORDER — SODIUM CHLORIDE 50 MG/ML
1 SOLUTION/ DROPS OPHTHALMIC EVERY 4 HOURS PRN
Qty: 15 ML | Refills: 5 | Status: SHIPPED | OUTPATIENT
Start: 2024-05-30 | End: 2025-05-30

## 2024-05-30 RX ORDER — TIMOLOL MALEATE 5 MG/ML
1 SOLUTION/ DROPS OPHTHALMIC 2 TIMES DAILY
Qty: 15 ML | Refills: 12 | Status: SHIPPED | OUTPATIENT
Start: 2024-05-30 | End: 2025-05-30

## 2024-05-30 RX ORDER — PREDNISOLONE ACETATE 10 MG/ML
1 SUSPENSION/ DROPS OPHTHALMIC 4 TIMES DAILY
Qty: 5 ML | Refills: 5 | Status: SHIPPED | OUTPATIENT
Start: 2024-05-30 | End: 2025-05-30

## 2024-05-30 NOTE — PROGRESS NOTES
HPI    6 months DFE OS  Using PF drops only currently , ran out of the other drops and needs a   refill  OD feels scratchy and irritated  Last edited by Lizbeth Gonzalez MA on 5/30/2024  9:01 AM.            Assessment /Plan     For exam results, see Encounter Report.    There are no diagnoses linked to this encounter.        OCTN  5/30/2024 OS: 92 all green     OCTM 5/30/2024 good contour, no srf irf oS     8/2022 b scan OD: no masses   10/27/2023: b scan OD  no masses or tumors, retina appears flat, vitreous clear      1. Microcystic corneal edema/bullous keratopathy OD  s/p MULTIPLE SURGERIES: most recently repeat DSAEK and IOL 5/2021  - Likely 2/2 h/o multiple surgeries (complex CE, RD repair, secondary IOL) including hx of an iris-sutured IOL  - s/p DSAEK OD (11/1/18); intraop discovered that sutured IOL had been sutured into superior cornea -> graft intentionally decentered inferiorly, could not reconstruct superior anterior chamber without cutting IOL sutures  - Graft became detached POW1 --> went to OR for revision 11/15/18  - BCVA after DSAEK documented 5/2019 @ 20/100 (20/400 prior to DSAEK)  - s/p 5/2021: repeat DSAEK and IOL- - had repeat DSAEK and IOL 5/2021, graft was detached postop, IOL is decentered  TESTING HISTORY  - Topography done May 2020, noted to have irregular astigmatism  - PLAN BY Dr. Almodovar 10/29/21 - IOP 22 OD. No intervention recommended. Patient with chronic ocular surface pain. May benefit from a procedure to improve comfort. Recommend comfort care in right eye and continued monitoring of left eye. Will try to arrange patient to go to Dr. Almodovar's office for scleral contact lens fitting for comfort OD.   - 8/2/22 provided patient with information to Dr. Almodovar clinic. Start Aileen 128 OD TID. B scan w/o masses OD  - 8/22/23: NLP, painful. Patient not wanting enucleation at this time. Restart aileen OD TID, start timolol qam, continue atropine daily, PF QID.  - 10/27/23: NLP comfortable  eye, continue atropine, pf, timolol, aileen. B scan today without masses or tumors  - 5/30/24: NLP. Irritating, is out of drops. Will refill atropine, pf, timolol, aileen. B-scan due in 6 months.     2. ERM, OD  - Previously imaged on Mac OCT in January 2019  - Mild-to-moderate in severity per Dr. Bowles in June 2019  - Stable thickening on OCT Mac 9/2/20, no IRF/SRF seen  - Likely visually significant given NIKIA results 9/2/20 (20/150) in addition to #1  - CTM    3. Glaucoma suspect, OU  4. ANGLE closure suspect   - Previous C/D ratio documented at 0.7 OS. However 8/2/22 C/D appears to be closer to 0.4 and p/s.  - Last //582  - Gonio 9/2/20: unable to 2/2 edema OD, but angle appears narrow temporally via Von Herick estimation//open to  OS  - OCT RNFL 6/2023 all green OS, small cup on dilated exam- can stop further octn at this time   - IOP wnl OS  - Low suspicion for glaucoma OS at this time. Can monitor with annual DFE now    5. PCIOL OS s/p yag cap  - VA 20/25 in clinic on 8/24/21  - fixed Mrx 09/2023  - Continue to monitor  - difficult posterior view due to patient cooperation    6. H/o retinal detachment, OD  - S/p PPV/lensectomy/IOL removal/EL/AFx/C3F8 16% for dislocated sulcus IOL with retinal detachment OD    7. Monocular precautions   - poly carb given 8/22/23  - difficult posterior exam due to patient cooperation, will get optos photos 5/30/24      RTC 6 mo optos photos OS, B-scan OD

## 2024-10-28 ENCOUNTER — HOSPITAL ENCOUNTER (OUTPATIENT)
Dept: RADIOLOGY | Facility: HOSPITAL | Age: 69
Discharge: HOME | End: 2024-10-28
Payer: MEDICARE

## 2024-10-28 DIAGNOSIS — M54.16 LUMBAR RADICULOPATHY: ICD-10-CM

## 2024-10-28 PROCEDURE — 72100 X-RAY EXAM L-S SPINE 2/3 VWS: CPT | Mod: TC

## 2024-12-05 ENCOUNTER — OFFICE VISIT (OUTPATIENT)
Dept: OPHTHALMOLOGY | Facility: CLINIC | Age: 69
End: 2024-12-05
Payer: MEDICARE

## 2024-12-05 VITALS — WEIGHT: 146 LBS | HEIGHT: 70 IN | BODY MASS INDEX: 20.9 KG/M2

## 2024-12-05 DIAGNOSIS — H40.039 PRIMARY ANGLE CLOSURE: ICD-10-CM

## 2024-12-05 DIAGNOSIS — Z96.1 PSEUDOPHAKIA: ICD-10-CM

## 2024-12-05 DIAGNOSIS — H18.11 BULLOUS KERATOPATHY OF RIGHT EYE: Primary | ICD-10-CM

## 2024-12-05 DIAGNOSIS — H35.373 MACULAR PUCKER OF BOTH EYES: ICD-10-CM

## 2024-12-05 DIAGNOSIS — Z86.69 HISTORY OF RETINAL DETACHMENT: ICD-10-CM

## 2024-12-05 PROCEDURE — 92250 FUNDUS PHOTOGRAPHY W/I&R: CPT | Mod: PBBFAC,PN

## 2024-12-05 PROCEDURE — 99213 OFFICE O/P EST LOW 20 MIN: CPT | Mod: PBBFAC,PN

## 2024-12-05 PROCEDURE — 76512 OPH US DX B-SCAN: CPT | Mod: PBBFAC,PN

## 2024-12-05 RX ORDER — DIPHENHYDRAMINE HCL 25 MG
4 CAPSULE ORAL
COMMUNITY

## 2024-12-05 NOTE — PROGRESS NOTES
HPI    6mos RC Optos OS and B-Scan.  - States using old glasses due to newer glasses breaking. Also stated he   could not see as well out of them and would like to reassess today.   - Using Prednisolone 1gtt QID OD, Timolol BID OD, Michael q4hrs OD, and   Atropine OU daily.   - States he would rather remove OD due to the irritation that it causes.   Last edited by Gale Vega LPN on 12/5/2024  9:19 AM.            Assessment /Plan     For exam results, see Encounter Report.    There are no diagnoses linked to this encounter.    HPI    6mos RC Optos OS and B-Scan.  - States using old glasses due to newer glasses breaking. Also stated he   could not see as well out of them and would like to reassess today.   - Using Prednisolone 1gtt QID OD, Timolol BID OD, Michael q4hrs OD, and   Atropine OU daily.   - States he would rather remove OD due to the irritation that it causes.   Last edited by Gale Vega LPN on 12/5/2024  9:19 AM.            Assessment /Plan     For exam results, see Encounter Report.    There are no diagnoses linked to this encounter.        OCTN  5/30/2024 OS: 92 all green     OCTM 5/30/2024 good contour, no srf irf oS     8/2022 b scan OD: no masses   10/27/2023: b scan OD  no masses or tumors, retina appears flat, vitreous clear      1. Microcystic corneal edema/bullous keratopathy OD  s/p MULTIPLE SURGERIES: most recently repeat DSAEK and IOL 5/2021  - Likely 2/2 h/o multiple surgeries (complex CE, RD repair, secondary IOL) including hx of an iris-sutured IOL  - s/p DSAEK OD (11/1/18); intraop discovered that sutured IOL had been sutured into superior cornea -> graft intentionally decentered inferiorly, could not reconstruct superior anterior chamber without cutting IOL sutures  - Graft became detached POW1 --> went to OR for revision 11/15/18  - BCVA after DSAEK documented 5/2019 @ 20/100 (20/400 prior to DSAEK)  - s/p 5/2021: repeat DSAEK and IOL- - had repeat DSAEK and IOL 5/2021, graft  was detached postop, IOL is decentered  TESTING HISTORY  - Topography done May 2020, noted to have irregular astigmatism  - PLAN BY Dr. Almodovar 10/29/21 - IOP 22 OD. No intervention recommended. Patient with chronic ocular surface pain. May benefit from a procedure to improve comfort. Recommend comfort care in right eye and continued monitoring of left eye. Will try to arrange patient to go to Dr. Almodovar's office for scleral contact lens fitting for comfort OD.   - 8/2/22 provided patient with information to Dr. Almodovar clinic. Start Aileen 128 OD TID. B scan w/o masses OD  - 8/22/23: NLP, painful. Patient not wanting enucleation at this time. Restart aileen OD TID, start timolol qam, continue atropine daily, PF QID.  - 10/27/23: NLP comfortable eye, continue atropine, pf, timolol, aileen. B scan today without masses or tumors  - 5/30/24: NLP. Irritating, is out of drops. Will refill atropine, pf, timolol, aileen. B-scan due in 6 months.   - 12/5/2024: NLP and irritating. PT interested in evisceration. Will start lubrication gel and encourage compliance with drops.   RTC 4 mo on vincent day for k check and evisceration evaluation. B-scan flat with no masses OD.     2. ERM, OD  - Previously imaged on Mac OCT in January 2019  - Mild-to-moderate in severity per Dr. Bowles in June 2019  - Stable thickening on OCT Mac 9/2/20, no IRF/SRF seen  - Likely visually significant given NIKIA results 9/2/20 (20/150) in addition to #1  - CTM    3. Glaucoma suspect, OU  4. ANGLE closure suspect   - Previous C/D ratio documented at 0.7 OS. However 8/2/22 C/D appears to be closer to 0.4 and p/s.  - Last //582  - Gonio 9/2/20: unable to 2/2 edema OD, but angle appears narrow temporally via Von Herick estimation//open to  OS  - OCT RNFL 6/2023 all green OS, small cup on dilated exam- can stop further octn at this time   - IOP wnl OS  - Low suspicion for glaucoma OS at this time. Can monitor with annual DFE now    5. PCIOL OS s/p yag  cap  - Continue to monitor  - difficult posterior view due to patient cooperation    6. H/o retinal detachment, OD  - S/p PPV/lensectomy/IOL removal/EL/AFx/C3F8 16% for dislocated sulcus IOL with retinal detachment OD    7. Monocular precautions   - poly carb given 12/5/2024  - difficult posterior exam due to patient cooperation, will get optos photos q6mo      RTC 4 mo vincent day for k check and evisceration eval

## 2024-12-11 ENCOUNTER — LAB VISIT (OUTPATIENT)
Dept: LAB | Facility: HOSPITAL | Age: 69
End: 2024-12-11
Attending: PAIN MEDICINE
Payer: MEDICARE

## 2024-12-11 DIAGNOSIS — Z79.899 NEED FOR PROPHYLACTIC CHEMOTHERAPY: Primary | ICD-10-CM

## 2024-12-11 DIAGNOSIS — E55.9 AVITAMINOSIS D: ICD-10-CM

## 2024-12-11 DIAGNOSIS — R79.9 ABNORMAL BLOOD CHEMISTRY: ICD-10-CM

## 2024-12-11 DIAGNOSIS — G62.9 PERIPHERAL NERVE DISORDER: ICD-10-CM

## 2024-12-11 LAB
25(OH)D3+25(OH)D2 SERPL-MCNC: 29 NG/ML (ref 30–80)
ALBUMIN SERPL-MCNC: 3.8 G/DL (ref 3.4–4.8)
ALBUMIN/GLOB SERPL: 1 RATIO (ref 1.1–2)
ALP SERPL-CCNC: 56 UNIT/L (ref 40–150)
ALT SERPL-CCNC: 15 UNIT/L (ref 0–55)
ANION GAP SERPL CALC-SCNC: 8 MEQ/L
AST SERPL-CCNC: 19 UNIT/L (ref 5–34)
BASOPHILS # BLD AUTO: 0.04 X10(3)/MCL
BASOPHILS NFR BLD AUTO: 0.6 %
BILIRUB SERPL-MCNC: 0.2 MG/DL
BUN SERPL-MCNC: 7.5 MG/DL (ref 8.4–25.7)
CALCIUM SERPL-MCNC: 9.7 MG/DL (ref 8.8–10)
CHLORIDE SERPL-SCNC: 108 MMOL/L (ref 98–107)
CK SERPL-CCNC: 245 U/L (ref 30–200)
CO2 SERPL-SCNC: 27 MMOL/L (ref 23–31)
CREAT SERPL-MCNC: 0.84 MG/DL (ref 0.72–1.25)
CREAT/UREA NIT SERPL: 9
EOSINOPHIL # BLD AUTO: 0.18 X10(3)/MCL (ref 0–0.9)
EOSINOPHIL NFR BLD AUTO: 2.7 %
ERYTHROCYTE [DISTWIDTH] IN BLOOD BY AUTOMATED COUNT: 15.8 % (ref 11.5–17)
GFR SERPLBLD CREATININE-BSD FMLA CKD-EPI: >60 ML/MIN/1.73/M2
GLOBULIN SER-MCNC: 3.8 GM/DL (ref 2.4–3.5)
GLUCOSE SERPL-MCNC: 89 MG/DL (ref 82–115)
HCT VFR BLD AUTO: 41.7 % (ref 42–52)
HGB BLD-MCNC: 13.8 G/DL (ref 14–18)
IMM GRANULOCYTES # BLD AUTO: 0.02 X10(3)/MCL (ref 0–0.04)
IMM GRANULOCYTES NFR BLD AUTO: 0.3 %
LYMPHOCYTES # BLD AUTO: 3.07 X10(3)/MCL (ref 0.6–4.6)
LYMPHOCYTES NFR BLD AUTO: 46.4 %
MCH RBC QN AUTO: 29.2 PG (ref 27–31)
MCHC RBC AUTO-ENTMCNC: 33.1 G/DL (ref 33–36)
MCV RBC AUTO: 88.2 FL (ref 80–94)
MONOCYTES # BLD AUTO: 0.53 X10(3)/MCL (ref 0.1–1.3)
MONOCYTES NFR BLD AUTO: 8 %
NEUTROPHILS # BLD AUTO: 2.77 X10(3)/MCL (ref 2.1–9.2)
NEUTROPHILS NFR BLD AUTO: 42 %
NRBC BLD AUTO-RTO: 0 %
PLATELET # BLD AUTO: 236 X10(3)/MCL (ref 130–400)
PMV BLD AUTO: 10.1 FL (ref 7.4–10.4)
POTASSIUM SERPL-SCNC: 4.8 MMOL/L (ref 3.5–5.1)
PROT SERPL-MCNC: 7.6 GM/DL (ref 5.8–7.6)
RBC # BLD AUTO: 4.73 X10(6)/MCL (ref 4.7–6.1)
SODIUM SERPL-SCNC: 143 MMOL/L (ref 136–145)
WBC # BLD AUTO: 6.61 X10(3)/MCL (ref 4.5–11.5)

## 2024-12-11 PROCEDURE — 84165 PROTEIN E-PHORESIS SERUM: CPT

## 2024-12-11 PROCEDURE — 82550 ASSAY OF CK (CPK): CPT

## 2024-12-11 PROCEDURE — 36415 COLL VENOUS BLD VENIPUNCTURE: CPT

## 2024-12-11 PROCEDURE — 80053 COMPREHEN METABOLIC PANEL: CPT

## 2024-12-11 PROCEDURE — 85025 COMPLETE CBC W/AUTO DIFF WBC: CPT

## 2024-12-11 PROCEDURE — 82306 VITAMIN D 25 HYDROXY: CPT

## 2024-12-12 LAB
ALBUMIN % SPEP (OHS): 46.97 (ref 48.1–59.5)
ALBUMIN SERPL-MCNC: 3.4 G/DL (ref 3.4–4.8)
ALBUMIN/GLOB SERPL: 0.9 RATIO (ref 1.1–2)
ALPHA 1 GLOB (OHS): 0.24 GM/DL (ref 0–0.4)
ALPHA 1 GLOB% (OHS): 3.37 (ref 2.3–4.9)
ALPHA 2 GLOB % (OHS): 12.54 (ref 6.9–13)
ALPHA 2 GLOB (OHS): 0.9 GM/DL (ref 0.4–1)
BETA GLOB (OHS): 0.97 GM/DL (ref 0.7–1.3)
BETA GLOB% (OHS): 13.53 (ref 13.8–19.7)
GAMMA GLOBULIN % (OHS): 23.59 (ref 10.1–21.9)
GAMMA GLOBULIN (OHS): 1.7 GM/DL (ref 0.4–1.8)
GLOBULIN SER-MCNC: 3.8 GM/DL (ref 2.4–3.5)
M SPIKE % (OHS): ABNORMAL
M SPIKE (OHS): ABNORMAL
PATH REV: NORMAL
PROT SERPL-MCNC: 7.2 GM/DL (ref 5.8–7.6)

## 2025-02-04 ENCOUNTER — LAB VISIT (OUTPATIENT)
Dept: LAB | Facility: HOSPITAL | Age: 70
End: 2025-02-04
Attending: PAIN MEDICINE
Payer: MEDICARE

## 2025-02-04 DIAGNOSIS — R79.9 ABNORMAL BLOOD CHEMISTRY: Primary | ICD-10-CM

## 2025-02-04 LAB
ALBUMIN SERPL-MCNC: 3.9 G/DL (ref 3.4–4.8)
ALBUMIN/GLOB SERPL: 1 RATIO (ref 1.1–2)
ALP SERPL-CCNC: 46 UNIT/L (ref 40–150)
ALT SERPL-CCNC: 12 UNIT/L (ref 0–55)
ANION GAP SERPL CALC-SCNC: 4 MEQ/L
AST SERPL-CCNC: 27 UNIT/L (ref 5–34)
BASOPHILS # BLD AUTO: 0.04 X10(3)/MCL
BASOPHILS NFR BLD AUTO: 0.8 %
BILIRUB SERPL-MCNC: 0.5 MG/DL
BUN SERPL-MCNC: 14.6 MG/DL (ref 8.4–25.7)
CALCIUM SERPL-MCNC: 9.6 MG/DL (ref 8.8–10)
CHLORIDE SERPL-SCNC: 112 MMOL/L (ref 98–107)
CO2 SERPL-SCNC: 24 MMOL/L (ref 23–31)
CREAT SERPL-MCNC: 0.82 MG/DL (ref 0.72–1.25)
CREAT/UREA NIT SERPL: 18
EOSINOPHIL # BLD AUTO: 0.19 X10(3)/MCL (ref 0–0.9)
EOSINOPHIL NFR BLD AUTO: 3.7 %
ERYTHROCYTE [DISTWIDTH] IN BLOOD BY AUTOMATED COUNT: 14.6 % (ref 11.5–17)
GFR SERPLBLD CREATININE-BSD FMLA CKD-EPI: >60 ML/MIN/1.73/M2
GLOBULIN SER-MCNC: 4.1 GM/DL (ref 2.4–3.5)
GLUCOSE SERPL-MCNC: 88 MG/DL (ref 82–115)
HCT VFR BLD AUTO: 40.6 % (ref 42–52)
HGB BLD-MCNC: 13.5 G/DL (ref 14–18)
IMM GRANULOCYTES # BLD AUTO: 0.01 X10(3)/MCL (ref 0–0.04)
IMM GRANULOCYTES NFR BLD AUTO: 0.2 %
LYMPHOCYTES # BLD AUTO: 2.89 X10(3)/MCL (ref 0.6–4.6)
LYMPHOCYTES NFR BLD AUTO: 55.7 %
MCH RBC QN AUTO: 28.8 PG (ref 27–31)
MCHC RBC AUTO-ENTMCNC: 33.3 G/DL (ref 33–36)
MCV RBC AUTO: 86.6 FL (ref 80–94)
MONOCYTES # BLD AUTO: 0.48 X10(3)/MCL (ref 0.1–1.3)
MONOCYTES NFR BLD AUTO: 9.2 %
NEUTROPHILS # BLD AUTO: 1.58 X10(3)/MCL (ref 2.1–9.2)
NEUTROPHILS NFR BLD AUTO: 30.4 %
NRBC BLD AUTO-RTO: 0 %
PLATELET # BLD AUTO: 258 X10(3)/MCL (ref 130–400)
PMV BLD AUTO: 10.5 FL (ref 7.4–10.4)
POTASSIUM SERPL-SCNC: 4.9 MMOL/L (ref 3.5–5.1)
PROT SERPL-MCNC: 8 GM/DL (ref 5.8–7.6)
RBC # BLD AUTO: 4.69 X10(6)/MCL (ref 4.7–6.1)
SODIUM SERPL-SCNC: 140 MMOL/L (ref 136–145)
WBC # BLD AUTO: 5.19 X10(3)/MCL (ref 4.5–11.5)

## 2025-02-04 PROCEDURE — 36415 COLL VENOUS BLD VENIPUNCTURE: CPT

## 2025-02-04 PROCEDURE — 85025 COMPLETE CBC W/AUTO DIFF WBC: CPT

## 2025-02-04 PROCEDURE — 80053 COMPREHEN METABOLIC PANEL: CPT

## 2025-02-15 ENCOUNTER — HOSPITAL ENCOUNTER (EMERGENCY)
Facility: HOSPITAL | Age: 70
Discharge: LEFT BEFORE DISCHARGE | End: 2025-02-15
Payer: MEDICARE

## 2025-02-15 VITALS
OXYGEN SATURATION: 100 % | SYSTOLIC BLOOD PRESSURE: 150 MMHG | DIASTOLIC BLOOD PRESSURE: 100 MMHG | BODY MASS INDEX: 21.77 KG/M2 | WEIGHT: 147 LBS | HEIGHT: 69 IN | RESPIRATION RATE: 20 BRPM | HEART RATE: 56 BPM | TEMPERATURE: 98 F

## 2025-02-15 PROCEDURE — 99281 EMR DPT VST MAYX REQ PHY/QHP: CPT

## 2025-02-15 NOTE — FIRST PROVIDER EVALUATION
Medical screening examination initiated.  I have conducted a focused provider triage encounter, findings are as follows:    Brief history of present illness:  68y/o M presents to the ED reports he feels the heart monitor that was place by the cardiologist on Thursday isn't connecting. States placed on the monitor for low heart rate. Denies symptoms.    There were no vitals filed for this visit.    Pertinent physical exam:  AAA x 3    Brief workup plan:  MD evaluation     Preliminary workup initiated; this workup will be continued and followed by the physician or advanced practice provider that is assigned to the patient when roomed.

## 2025-02-20 ENCOUNTER — LAB VISIT (OUTPATIENT)
Dept: LAB | Facility: HOSPITAL | Age: 70
End: 2025-02-20
Attending: PAIN MEDICINE
Payer: MEDICARE

## 2025-02-20 DIAGNOSIS — R79.9 ABNORMAL BLOOD CHEMISTRY: Primary | ICD-10-CM

## 2025-02-20 LAB
ALBUMIN SERPL-MCNC: 4 G/DL (ref 3.4–4.8)
ALBUMIN/GLOB SERPL: 0.9 RATIO (ref 1.1–2)
ALP SERPL-CCNC: 53 UNIT/L (ref 40–150)
ALT SERPL-CCNC: 12 UNIT/L (ref 0–55)
ANION GAP SERPL CALC-SCNC: 6 MEQ/L
AST SERPL-CCNC: 18 UNIT/L (ref 5–34)
BASOPHILS # BLD AUTO: 0.04 X10(3)/MCL
BASOPHILS NFR BLD AUTO: 0.7 %
BILIRUB SERPL-MCNC: 0.5 MG/DL
BUN SERPL-MCNC: 11.1 MG/DL (ref 8.4–25.7)
CALCIUM SERPL-MCNC: 10 MG/DL (ref 8.8–10)
CHLORIDE SERPL-SCNC: 106 MMOL/L (ref 98–107)
CO2 SERPL-SCNC: 26 MMOL/L (ref 23–31)
CREAT SERPL-MCNC: 0.78 MG/DL (ref 0.72–1.25)
CREAT/UREA NIT SERPL: 14
EOSINOPHIL # BLD AUTO: 0.15 X10(3)/MCL (ref 0–0.9)
EOSINOPHIL NFR BLD AUTO: 2.6 %
ERYTHROCYTE [DISTWIDTH] IN BLOOD BY AUTOMATED COUNT: 14.9 % (ref 11.5–17)
GFR SERPLBLD CREATININE-BSD FMLA CKD-EPI: >60 ML/MIN/1.73/M2
GLOBULIN SER-MCNC: 4.4 GM/DL (ref 2.4–3.5)
GLUCOSE SERPL-MCNC: 70 MG/DL (ref 82–115)
HCT VFR BLD AUTO: 43.5 % (ref 42–52)
HGB BLD-MCNC: 14.4 G/DL (ref 14–18)
IMM GRANULOCYTES # BLD AUTO: 0.02 X10(3)/MCL (ref 0–0.04)
IMM GRANULOCYTES NFR BLD AUTO: 0.4 %
LYMPHOCYTES # BLD AUTO: 3.08 X10(3)/MCL (ref 0.6–4.6)
LYMPHOCYTES NFR BLD AUTO: 54.3 %
MCH RBC QN AUTO: 29.1 PG (ref 27–31)
MCHC RBC AUTO-ENTMCNC: 33.1 G/DL (ref 33–36)
MCV RBC AUTO: 87.9 FL (ref 80–94)
MONOCYTES # BLD AUTO: 0.38 X10(3)/MCL (ref 0.1–1.3)
MONOCYTES NFR BLD AUTO: 6.7 %
NEUTROPHILS # BLD AUTO: 2 X10(3)/MCL (ref 2.1–9.2)
NEUTROPHILS NFR BLD AUTO: 35.3 %
NRBC BLD AUTO-RTO: 0 %
PLATELET # BLD AUTO: 281 X10(3)/MCL (ref 130–400)
PMV BLD AUTO: 10.2 FL (ref 7.4–10.4)
POTASSIUM SERPL-SCNC: 4.5 MMOL/L (ref 3.5–5.1)
PROT SERPL-MCNC: 8.4 GM/DL (ref 5.8–7.6)
RBC # BLD AUTO: 4.95 X10(6)/MCL (ref 4.7–6.1)
SODIUM SERPL-SCNC: 138 MMOL/L (ref 136–145)
WBC # BLD AUTO: 5.67 X10(3)/MCL (ref 4.5–11.5)

## 2025-02-20 PROCEDURE — 80053 COMPREHEN METABOLIC PANEL: CPT

## 2025-02-20 PROCEDURE — 85025 COMPLETE CBC W/AUTO DIFF WBC: CPT

## 2025-02-20 PROCEDURE — 36415 COLL VENOUS BLD VENIPUNCTURE: CPT

## 2025-03-11 DIAGNOSIS — R77.8 ABNORMAL SPEP: ICD-10-CM

## 2025-03-11 DIAGNOSIS — D89.89 KAPPA LIGHT CHAIN DISEASE: Primary | ICD-10-CM

## 2025-03-19 ENCOUNTER — LAB VISIT (OUTPATIENT)
Dept: LAB | Facility: HOSPITAL | Age: 70
End: 2025-03-19
Attending: STUDENT IN AN ORGANIZED HEALTH CARE EDUCATION/TRAINING PROGRAM
Payer: MEDICARE

## 2025-03-19 DIAGNOSIS — Z12.11 SPECIAL SCREENING FOR MALIGNANT NEOPLASMS, COLON: ICD-10-CM

## 2025-03-19 DIAGNOSIS — R79.9 ABNORMAL BLOOD CHEMISTRY: Primary | ICD-10-CM

## 2025-03-19 DIAGNOSIS — G62.9 PERIPHERAL NERVE DISORDER: ICD-10-CM

## 2025-03-19 DIAGNOSIS — Z79.899 NEED FOR PROPHYLACTIC CHEMOTHERAPY: ICD-10-CM

## 2025-03-19 DIAGNOSIS — E55.9 AVITAMINOSIS D: ICD-10-CM

## 2025-03-19 LAB
ANION GAP SERPL CALC-SCNC: 5 MEQ/L
BASOPHILS # BLD AUTO: 0.04 X10(3)/MCL
BASOPHILS NFR BLD AUTO: 0.7 %
BUN SERPL-MCNC: 13 MG/DL (ref 8.4–25.7)
CALCIUM SERPL-MCNC: 9.2 MG/DL (ref 8.8–10)
CHLORIDE SERPL-SCNC: 108 MMOL/L (ref 98–107)
CO2 SERPL-SCNC: 24 MMOL/L (ref 23–31)
CREAT SERPL-MCNC: 0.79 MG/DL (ref 0.72–1.25)
CREAT/UREA NIT SERPL: 16
EOSINOPHIL # BLD AUTO: 0.11 X10(3)/MCL (ref 0–0.9)
EOSINOPHIL NFR BLD AUTO: 1.9 %
ERYTHROCYTE [DISTWIDTH] IN BLOOD BY AUTOMATED COUNT: 15.6 % (ref 11.5–17)
GFR SERPLBLD CREATININE-BSD FMLA CKD-EPI: >60 ML/MIN/1.73/M2
GLUCOSE SERPL-MCNC: 73 MG/DL (ref 82–115)
HCT VFR BLD AUTO: 41.2 % (ref 42–52)
HGB BLD-MCNC: 13.6 G/DL (ref 14–18)
IMM GRANULOCYTES # BLD AUTO: 0.02 X10(3)/MCL (ref 0–0.04)
IMM GRANULOCYTES NFR BLD AUTO: 0.3 %
LYMPHOCYTES # BLD AUTO: 3.58 X10(3)/MCL (ref 0.6–4.6)
LYMPHOCYTES NFR BLD AUTO: 60.5 %
MCH RBC QN AUTO: 29.2 PG (ref 27–31)
MCHC RBC AUTO-ENTMCNC: 33 G/DL (ref 33–36)
MCV RBC AUTO: 88.6 FL (ref 80–94)
MONOCYTES # BLD AUTO: 0.48 X10(3)/MCL (ref 0.1–1.3)
MONOCYTES NFR BLD AUTO: 8.1 %
NEUTROPHILS # BLD AUTO: 1.69 X10(3)/MCL (ref 2.1–9.2)
NEUTROPHILS NFR BLD AUTO: 28.5 %
NRBC BLD AUTO-RTO: 0 %
PLATELET # BLD AUTO: 282 X10(3)/MCL (ref 130–400)
PMV BLD AUTO: 10.7 FL (ref 7.4–10.4)
POTASSIUM SERPL-SCNC: 4.5 MMOL/L (ref 3.5–5.1)
RBC # BLD AUTO: 4.65 X10(6)/MCL (ref 4.7–6.1)
SODIUM SERPL-SCNC: 137 MMOL/L (ref 136–145)
WBC # BLD AUTO: 5.92 X10(3)/MCL (ref 4.5–11.5)

## 2025-03-19 PROCEDURE — 85025 COMPLETE CBC W/AUTO DIFF WBC: CPT

## 2025-03-19 PROCEDURE — 36415 COLL VENOUS BLD VENIPUNCTURE: CPT

## 2025-03-19 PROCEDURE — 80048 BASIC METABOLIC PNL TOTAL CA: CPT

## 2025-03-25 RX ORDER — CHLORZOXAZONE 500 MG/1
500 TABLET ORAL 2 TIMES DAILY
Status: ON HOLD | COMMUNITY
Start: 2025-02-20 | End: 2025-04-02 | Stop reason: HOSPADM

## 2025-03-25 RX ORDER — APIXABAN 5 MG/1
5 TABLET, FILM COATED ORAL 2 TIMES DAILY
COMMUNITY
Start: 2025-03-03

## 2025-03-31 DIAGNOSIS — M16.12 OSTEOARTHRITIS OF LEFT HIP: Primary | ICD-10-CM

## 2025-03-31 NOTE — CLINICAL REVIEW
Eliquis last dose 3/29/25. labs reviewed. cardiology notes utd. Echo noted. chart review complete. ccv

## 2025-04-01 ENCOUNTER — HOSPITAL ENCOUNTER (OUTPATIENT)
Facility: HOSPITAL | Age: 70
LOS: 1 days | Discharge: HOME | End: 2025-04-02
Attending: STUDENT IN AN ORGANIZED HEALTH CARE EDUCATION/TRAINING PROGRAM | Admitting: STUDENT IN AN ORGANIZED HEALTH CARE EDUCATION/TRAINING PROGRAM
Payer: MEDICARE

## 2025-04-01 DIAGNOSIS — I49.5 SICK SINUS SYNDROME: ICD-10-CM

## 2025-04-01 DIAGNOSIS — I49.9 IRREGULAR CARDIAC RHYTHM: ICD-10-CM

## 2025-04-01 DIAGNOSIS — I49.9 ARRHYTHMIA: ICD-10-CM

## 2025-04-01 DIAGNOSIS — I49.5 SINOATRIAL NODE DYSFUNCTION: ICD-10-CM

## 2025-04-01 PROBLEM — F17.200 TOBACCO DEPENDENCY: Status: ACTIVE | Noted: 2025-04-01

## 2025-04-01 LAB
OHS QRS DURATION: 86 MS
OHS QTC CALCULATION: 432 MS

## 2025-04-01 PROCEDURE — C1786 PMKR, SINGLE, RATE-RESP: HCPCS | Performed by: STUDENT IN AN ORGANIZED HEALTH CARE EDUCATION/TRAINING PROGRAM

## 2025-04-01 PROCEDURE — 99152 MOD SED SAME PHYS/QHP 5/>YRS: CPT | Performed by: STUDENT IN AN ORGANIZED HEALTH CARE EDUCATION/TRAINING PROGRAM

## 2025-04-01 PROCEDURE — C1769 GUIDE WIRE: HCPCS | Performed by: STUDENT IN AN ORGANIZED HEALTH CARE EDUCATION/TRAINING PROGRAM

## 2025-04-01 PROCEDURE — 11000001 HC ACUTE MED/SURG PRIVATE ROOM

## 2025-04-01 PROCEDURE — G0378 HOSPITAL OBSERVATION PER HR: HCPCS

## 2025-04-01 PROCEDURE — 93010 ELECTROCARDIOGRAM REPORT: CPT | Mod: ,,, | Performed by: INTERNAL MEDICINE

## 2025-04-01 PROCEDURE — 63600175 PHARM REV CODE 636 W HCPCS: Performed by: STUDENT IN AN ORGANIZED HEALTH CARE EDUCATION/TRAINING PROGRAM

## 2025-04-01 PROCEDURE — 21400001 HC TELEMETRY ROOM

## 2025-04-01 PROCEDURE — 99153 MOD SED SAME PHYS/QHP EA: CPT | Performed by: STUDENT IN AN ORGANIZED HEALTH CARE EDUCATION/TRAINING PROGRAM

## 2025-04-01 PROCEDURE — 27000221 HC OXYGEN, UP TO 24 HOURS

## 2025-04-01 PROCEDURE — 25000003 PHARM REV CODE 250: Performed by: NURSE PRACTITIONER

## 2025-04-01 PROCEDURE — 93005 ELECTROCARDIOGRAM TRACING: CPT

## 2025-04-01 PROCEDURE — 25500020 PHARM REV CODE 255: Performed by: STUDENT IN AN ORGANIZED HEALTH CARE EDUCATION/TRAINING PROGRAM

## 2025-04-01 PROCEDURE — 33274 TCAT INSJ/RPL PERM LDLS PM: CPT | Performed by: STUDENT IN AN ORGANIZED HEALTH CARE EDUCATION/TRAINING PROGRAM

## 2025-04-01 PROCEDURE — C1894 INTRO/SHEATH, NON-LASER: HCPCS | Performed by: STUDENT IN AN ORGANIZED HEALTH CARE EDUCATION/TRAINING PROGRAM

## 2025-04-01 DEVICE — LEADLESS PACEMAKER
Type: IMPLANTABLE DEVICE | Site: HEART | Status: FUNCTIONAL
Brand: AVEIR™

## 2025-04-01 RX ORDER — ACETAMINOPHEN 325 MG/1
650 TABLET ORAL EVERY 4 HOURS PRN
Status: DISCONTINUED | OUTPATIENT
Start: 2025-04-01 | End: 2025-04-01

## 2025-04-01 RX ORDER — CEFUROXIME SODIUM 1.5 G/16ML
1.5 INJECTION, POWDER, FOR SOLUTION INTRAVENOUS
Status: DISCONTINUED | OUTPATIENT
Start: 2025-04-01 | End: 2025-04-01 | Stop reason: HOSPADM

## 2025-04-01 RX ORDER — MIDAZOLAM HYDROCHLORIDE 1 MG/ML
INJECTION INTRAMUSCULAR; INTRAVENOUS
Status: DISCONTINUED | OUTPATIENT
Start: 2025-04-01 | End: 2025-04-01 | Stop reason: HOSPADM

## 2025-04-01 RX ORDER — DIPHENHYDRAMINE HYDROCHLORIDE 50 MG/ML
INJECTION, SOLUTION INTRAMUSCULAR; INTRAVENOUS
Status: DISCONTINUED | OUTPATIENT
Start: 2025-04-01 | End: 2025-04-01 | Stop reason: HOSPADM

## 2025-04-01 RX ORDER — HEPARIN SODIUM 1000 [USP'U]/ML
INJECTION, SOLUTION INTRAVENOUS; SUBCUTANEOUS
Status: DISCONTINUED | OUTPATIENT
Start: 2025-04-01 | End: 2025-04-01 | Stop reason: HOSPADM

## 2025-04-01 RX ORDER — ACETAMINOPHEN 500 MG
1000 TABLET ORAL EVERY 4 HOURS PRN
Status: DISCONTINUED | OUTPATIENT
Start: 2025-04-01 | End: 2025-04-02 | Stop reason: HOSPADM

## 2025-04-01 RX ORDER — FENTANYL CITRATE 50 UG/ML
INJECTION, SOLUTION INTRAMUSCULAR; INTRAVENOUS
Status: DISCONTINUED | OUTPATIENT
Start: 2025-04-01 | End: 2025-04-01 | Stop reason: HOSPADM

## 2025-04-01 RX ORDER — IOPAMIDOL 755 MG/ML
INJECTION, SOLUTION INTRAVASCULAR
Status: DISCONTINUED | OUTPATIENT
Start: 2025-04-01 | End: 2025-04-01 | Stop reason: HOSPADM

## 2025-04-01 RX ORDER — LIDOCAINE HYDROCHLORIDE 10 MG/ML
INJECTION, SOLUTION EPIDURAL; INFILTRATION; INTRACAUDAL; PERINEURAL
Status: DISCONTINUED | OUTPATIENT
Start: 2025-04-01 | End: 2025-04-01 | Stop reason: HOSPADM

## 2025-04-01 RX ADMIN — ACETAMINOPHEN 1000 MG: 500 TABLET ORAL at 09:04

## 2025-04-01 NOTE — Clinical Note
Delivery catheter for LA leadless PM prepped and inserted under fluoroscopy. Leadless LA pacemaker deployed. Device interrogated. Venogram performed.

## 2025-04-01 NOTE — Clinical Note
Delivery catheter for LV leadless PM prepped and inserted under fluoroscopy. Leadless LV pacemaker deployed. Device interrogated. Venogram performed.

## 2025-04-02 VITALS
BODY MASS INDEX: 21.19 KG/M2 | OXYGEN SATURATION: 100 % | RESPIRATION RATE: 18 BRPM | SYSTOLIC BLOOD PRESSURE: 122 MMHG | HEIGHT: 69 IN | DIASTOLIC BLOOD PRESSURE: 73 MMHG | TEMPERATURE: 98 F | HEART RATE: 75 BPM | WEIGHT: 143.06 LBS

## 2025-04-02 PROBLEM — I49.5 SICK SINUS SYNDROME: Status: ACTIVE | Noted: 2025-04-02

## 2025-04-02 LAB
HCT VFR BLD AUTO: 39.5 % (ref 42–52)
HCT VFR BLD AUTO: 42.2 % (ref 42–52)
HGB BLD-MCNC: 13.3 G/DL (ref 14–18)
HGB BLD-MCNC: 13.9 G/DL (ref 14–18)
OHS QRS DURATION: 76 MS
OHS QTC CALCULATION: 447 MS

## 2025-04-02 PROCEDURE — 85014 HEMATOCRIT: CPT | Performed by: NURSE PRACTITIONER

## 2025-04-02 PROCEDURE — 93005 ELECTROCARDIOGRAM TRACING: CPT

## 2025-04-02 PROCEDURE — 85018 HEMOGLOBIN: CPT | Performed by: NURSE PRACTITIONER

## 2025-04-02 PROCEDURE — 93010 ELECTROCARDIOGRAM REPORT: CPT | Mod: ,,, | Performed by: INTERNAL MEDICINE

## 2025-04-02 PROCEDURE — 25000003 PHARM REV CODE 250: Performed by: NURSE PRACTITIONER

## 2025-04-02 PROCEDURE — G0378 HOSPITAL OBSERVATION PER HR: HCPCS

## 2025-04-02 PROCEDURE — 36415 COLL VENOUS BLD VENIPUNCTURE: CPT | Performed by: NURSE PRACTITIONER

## 2025-04-02 RX ADMIN — SODIUM CHLORIDE 500 ML: 9 INJECTION, SOLUTION INTRAVENOUS at 07:04

## 2025-04-02 RX ADMIN — ACETAMINOPHEN 1000 MG: 500 TABLET ORAL at 06:04

## 2025-04-02 NOTE — DISCHARGE SUMMARY
"[facility]    Cardiology  Discharge Summary      Patient Name: Johnathon Santos  MRN: [id number]  Admission Date: 4/1/2025  Hospital Length of Stay: 1 days  Discharge Date and Time: 04/02/2025 11:52 AM  Attending Physician: Bruce Dalton MD  Discharging Provider: OLIVE Evans  Primary Care Physician: Rome Reid DO    HPI/Hospital Course: Mr. Santos is a 70 y/o male with a history of SSS, VHD, who is known to [facility], Dr. Rivas/Sha. He presented to [facility] on 4.1.25 for a scheduled Micra. He underwent a Successful implantation of dual-chamber PPM Leadless.       4.1.25: NAD. VSS. No complaints of CP/SOB/palps. "I feel okay" had some bleeding for site - no hematoma noted    PMH: SSS, VHD, Chronic Hepatitis C, Depression  PSH: Denies   Family History: Mother - Aneurysm   Social History: history of Cocaine abuse. Xavier smoking and alcohol use    Previous Cardiac Diagnostics:  PPM Insertion (4.1.25):  Successful implantation of dual-chamber PPM Leadless.     Procedure(s) (LRB):  INSERTION, CARDIAC PACEMAKER, LEADLESS (N/A)   Consults:   Final Active Diagnoses:    Diagnosis Date Noted POA    PRINCIPAL PROBLEM:  Sick sinus syndrome [I49.5] 04/02/2025 Yes    Tobacco dependency [F17.200] 04/01/2025 Yes      Problems Resolved During this Admission:     Discharged Condition: stable  Review of Systems   Eyes:  Negative for double vision.   Cardiovascular:  Negative for chest pain, dyspnea on exertion, leg swelling and palpitations.   Respiratory:  Negative for shortness of breath.    All other systems reviewed and are negative.    Physical Exam  Vitals and nursing note reviewed.   HENT:      Head: Normocephalic.      Nose: Nose normal.      Mouth/Throat:      Mouth: Mucous membranes are moist.   Eyes:      Extraocular Movements: Extraocular movements intact.   Cardiovascular:      Rate and Rhythm: Normal rate and regular rhythm.      Pulses: Normal pulses.   Pulmonary:      Effort: " Pulmonary effort is normal.   Abdominal:      Palpations: Abdomen is soft.   Musculoskeletal:         General: Normal range of motion.      Cervical back: Normal range of motion.   Skin:     General: Skin is warm.      Comments: R Groin Soft/Flat, Non-Tender, No Sign of Bleed/Infection. +2 BLE Palpable Pedal Pulses     Neurological:      Mental Status: He is alert and oriented to person, place, and time.   Psychiatric:         Mood and Affect: Mood normal.         Behavior: Behavior normal.       Disposition: Home or Self Care  Follow Up:   Follow-up Information       Bruce Dalton MD Follow up on 4/9/2025.    Specialties: Cardiology, Electrophysiology  Why: 11 AM  Contact information:  [address]  [facility] [address] [phone]                           Patient Instructions:      Diet Cardiac     Lifting restrictions   Order Comments: Do not lift anything greater than 5 pounds for 1 week     No driving until:   Order Comments: No driving for 1 week     Activity as tolerated     Shower on day dressing removed (No bath)   Order Comments: Showers only for 1 week     Medications:  Reconciled Home Medications:      Medication List        CONTINUE taking these medications      DULoxetine 30 MG capsule  Commonly known as: CYMBALTA  Take 120 mg by mouth once daily.     ELIQUIS 5 mg Tab  Generic drug: apixaban  Take 5 mg by mouth 2 (two) times daily.     gabapentin 800 MG tablet  Commonly known as: NEURONTIN  Take 800 mg by mouth 2 (two) times daily.     hydrOXYzine pamoate 25 MG Cap  Commonly known as: VISTARIL  Take 1-2 capsules by mouth nightly as needed.     nortriptyline 10 MG capsule  Commonly known as: PAMELOR  Take 3 capsules by mouth nightly.     prednisoLONE acetate 1 % Drps  Commonly known as: PRED FORTE  Place 1 drop into the right eye 4 (four) times daily.     sodium chloride 5% 5 % ophthalmic solution  Commonly known as: KARO 128  Place 1 drop into the right eye every 4 (four) hours as  needed.     timolol maleate 0.5% 0.5 % Drop  Commonly known as: TIMOPTIC  Place 1 drop into the right eye 2 (two) times daily.            STOP taking these medications      acetaminophen-codeine 300-30mg 300-30 mg Tab  Commonly known as: TYLENOL #3     amitriptyline 10 MG tablet  Commonly known as: ELAVIL     ammonium lactate 12 % lotion  Commonly known as: LAC-HYDRIN     amoxicillin 500 MG capsule  Commonly known as: AMOXIL     atropine 1% 1 % Drop  Commonly known as: ISOPTO ATROPINE     baclofen 10 MG tablet  Commonly known as: LIORESAL     chlorzoxazone 500 mg Tab  Commonly known as: PARAFON FORTE     diclofenac 75 MG EC tablet  Commonly known as: VOLTAREN     mirtazapine 15 MG tablet  Commonly known as: REMERON     MULTIPLE VITAMINS DAILY ORAL     nicotine polacrilex 4 MG Gum  Commonly known as: NICORETTE     NORCO 5-325 mg per tablet  Generic drug: HYDROcodone-acetaminophen     sildenafiL 100 MG tablet  Commonly known as: VIAGRA     temazepam 15 mg Cap  Commonly known as: RESTORIL     temazepam 30 mg capsule  Commonly known as: RESTORIL            Impression:  SSS    - s/p PPM (4.1.25): Successful implantation of dual-chamber PPM Leadless.   VHD    - Mild MR/TR/NC    - Trace AR   Chronic Hepatitis C  Depression  No known history of GI Bleed     Plan:   Discharge Home  Resume Home Medications    Groin Precautions    - Do not drive for 1 week    - Do not lift anything greater than 5 pounds for 1 week    - Showers only for 1 week    - Monitor for signs of infection/bleeding   Follow-up with Dr. Dalton in 1-2 weeks    Time spent on the discharge of patient: 31 minutes    OLIVE Evans  Cardiology  [facility]

## 2025-04-02 NOTE — PLAN OF CARE
04/02/25 0925   Discharge Assessment   Assessment Type Discharge Planning Assessment   Confirmed/corrected address, phone number and insurance Yes   Confirmed Demographics Correct on Facesheet   Source of Information patient;family   When was your last doctors appointment?   (PCP is Dr. Rome Reid. Patient reports last PCP appointment was last year.)   Reason For Admission Pacemaker Placement   People in Home sibling(s)   Do you expect to return to your current living situation? Yes   Current cognitive status: Alert/Oriented   Walking or Climbing Stairs Difficulty no   Dressing/Bathing Difficulty no   Home Accessibility stairs to enter home   Number of Stairs, Main Entrance two   Equipment Currently Used at Home none   Readmission within 30 days? No   Do you currently have service(s) that help you manage your care at home? No   Do you take prescription medications? Yes   Do you have prescription coverage? Yes   Do you have any problems affording any of your prescribed medications? No   Who is going to help you get home at discharge? Family/Friend   How do you get to doctors appointments? car, drives self   Are you on dialysis? No   Do you take coumadin? No   Discharge Plan A Home   Discharge Plan B Home   Discharge Plan discussed with: Patient

## 2025-04-02 NOTE — DISCHARGE INSTRUCTIONS
Leadless Pacemaker:    · POST-OP CARE:  o May remove dressing 24 hours after procedure. (Soak dressing with warm water and gently peel off. DO NOT RIP).  o You may shower with antibacterial soap and water only. NO tub bathing/swimming for 1 week!  o Keep the site clean and dry. No ointments, powder, cologne, or lotion near the site until fully healed. (About 2 weeks)  o Monitor site for signs or symptoms of infection  § Fever >101  § Yellow/green drainage  § Swelling  § Worsening pain  § Dizziness/fainting    o NO LIFTING PUSHING OR PULLING ANYTHING GREATER THAN 10 POUNDS (A GALLON OF MILK) FOR 1 week.  o NO DRIVING FOR 1 week. (This includes all motor vehicles)    o If you begin to bleed, lay flat immediately, apply firm direct pressure to the site. CALL 911. (THIS IS AN EMERGENCY!!)       Our goal at [facility] is to always give you outstanding care and exceptional service. You may receive a survey from [employer] by mail, text or e-mail in the next 24-48 hours asking about the care you received with us. The survey should only take 5-10 minutes to complete and is very important to us.     Your feedback provides us with a way to recognize our staff who work tirelessly to provide the best care! Also, your responses help us learn how to improve when your experience was below our aspiration of excellence. We are always looking for ways to improve your stay. We WILL use your feedback to continue making improvements to help us provide the highest quality care. We keep your personal information and feedback confidential. We appreciate your time completing this survey and can't wait to hear from you!!!    We look forward to your continued care with us! Thanks so much for choosing [facility] for your healthcare needs!

## 2025-04-02 NOTE — PLAN OF CARE
04/02/25 1240   Final Note   Assessment Type Final Discharge Note   Anticipated Discharge Disposition Home   Post-Acute Status   Post-Acute Authorization HME     No discharge needs.

## 2025-04-03 ENCOUNTER — PATIENT OUTREACH (OUTPATIENT)
Dept: ADMINISTRATIVE | Facility: CLINIC | Age: 70
End: 2025-04-03
Payer: MEDICARE

## 2025-04-03 NOTE — PROGRESS NOTES
C3 nurse spoke with Johnathon Santos for a TCC post hospital discharge follow up call. The patient has a scheduled [facility] appointment with Bruce Dalton MD (cardiology) on 4/9/25 @ 11:00am. The patient noted that he thinks his PCP retired, but he will call the office to see if there is another provider that he can establish care with.

## 2025-04-10 ENCOUNTER — OFFICE VISIT (OUTPATIENT)
Dept: OPHTHALMOLOGY | Facility: CLINIC | Age: 70
End: 2025-04-10
Payer: MEDICARE

## 2025-04-10 VITALS — HEIGHT: 69 IN | BODY MASS INDEX: 21.19 KG/M2 | WEIGHT: 143.06 LBS

## 2025-04-10 DIAGNOSIS — Z86.69 HISTORY OF RETINAL DETACHMENT: ICD-10-CM

## 2025-04-10 DIAGNOSIS — H35.371 MACULAR PUCKER, RIGHT: ICD-10-CM

## 2025-04-10 DIAGNOSIS — H18.11 BULLOUS KERATOPATHY OF RIGHT EYE: Primary | ICD-10-CM

## 2025-04-10 DIAGNOSIS — H40.023 AT HIGH RISK FOR OPEN ANGLE GLAUCOMA OF BOTH EYES: ICD-10-CM

## 2025-04-10 DIAGNOSIS — H40.051 OCULAR HYPERTENSION OF RIGHT EYE: ICD-10-CM

## 2025-04-10 PROCEDURE — 99213 OFFICE O/P EST LOW 20 MIN: CPT | Mod: PBBFAC,PN

## 2025-04-10 RX ORDER — DORZOLAMIDE HCL 20 MG/ML
1 SOLUTION/ DROPS OPHTHALMIC 3 TIMES DAILY
Qty: 10 ML | Refills: 11 | Status: SHIPPED | OUTPATIENT
Start: 2025-04-10 | End: 2026-04-10

## 2025-04-10 RX ORDER — ATROPINE SULFATE 10 MG/ML
1 SOLUTION/ DROPS OPHTHALMIC 2 TIMES DAILY
COMMUNITY
End: 2025-04-10 | Stop reason: SDUPTHER

## 2025-04-10 RX ORDER — TEMAZEPAM 30 MG/1
1 CAPSULE ORAL
COMMUNITY
Start: 2025-04-07

## 2025-04-10 RX ORDER — MIRTAZAPINE 15 MG/1
TABLET, FILM COATED ORAL
COMMUNITY
Start: 2025-04-07

## 2025-04-10 RX ORDER — ATROPINE SULFATE 10 MG/ML
1 SOLUTION/ DROPS OPHTHALMIC DAILY
Qty: 5 ML | Refills: 11 | Status: SHIPPED | OUTPATIENT
Start: 2025-04-10

## 2025-04-10 RX ORDER — SILDENAFIL 100 MG/1
TABLET, FILM COATED ORAL
COMMUNITY

## 2025-04-10 NOTE — PROGRESS NOTES
[facility]    RTC 4 mo vincent day for k check and evisceration eval  Patient stated that he has been out of his red top drops since about   4/03/2025. He stated that the pharmacy no longer calls him to let him know   he has any medication ready.   Last edited by Michelle Peter MA on 4/10/2025 12:16 PM.            Assessment /Plan     For exam results, see Encounter Report.    Bullous keratopathy of right eye  -     atropine 1% (ISOPTO ATROPINE) 1 % Drop; Place 1 drop into the right eye once daily.  Dispense: 5 mL; Refill: 11    Ocular hypertension of right eye  -     dorzolamide (TRUSOPT) 2 % ophthalmic solution; Place 1 drop into the right eye 3 (three) times daily.  Dispense: 10 mL; Refill: 11    History of retinal detachment    At high risk for open angle glaucoma of both eyes    Macular pucker, right        OCTN  5/30/2024 OS: 92 all green     OCTM 5/30/2024 good contour, no srf irf oS     8/2022 b scan OD: no masses   10/27/2023: b scan OD  no masses or tumors, retina appears flat, vitreous clear      1. Microcystic corneal edema/bullous keratopathy OD  s/p MULTIPLE SURGERIES: most recently repeat DSAEK and IOL 5/2021  - Likely 2/2 h/o multiple surgeries (complex CE, RD repair, secondary IOL) including hx of an iris-sutured IOL  - s/p DSAEK OD (11/1/18); intraop discovered that sutured IOL had been sutured into superior cornea -> graft intentionally decentered inferiorly, could not reconstruct superior anterior chamber without cutting IOL sutures  - Graft became detached POW1 --> went to OR for revision 11/15/18  - BCVA after DSAEK documented 5/2019 @ 20/100 (20/400 prior to DSAEK)  - s/p 5/2021: repeat DSAEK and IOL- - had repeat DSAEK and IOL 5/2021, graft was detached postop, IOL is decentered  TESTING HISTORY  - Topography done May 2020, noted to have irregular astigmatism  - PLAN BY Dr. Almodovar 10/29/21 - IOP 22 OD. No intervention recommended. Patient with chronic ocular surface pain. May benefit from a  procedure to improve comfort. Recommend comfort care in right eye and continued monitoring of left eye. Will try to arrange patient to go to Dr. Almodovar's office for scleral contact lens fitting for comfort OD.   - 8/2/22 provided patient with information to Dr. Almodovar clinic. Start Aileen 128 OD TID. B scan w/o masses OD  - 8/22/23: NLP, painful. Patient not wanting enucleation at this time. Restart aileen OD TID, start timolol qam, continue atropine daily, PF QID.  - 10/27/23: NLP comfortable eye, continue atropine, pf, timolol, aileen. B scan today without masses or tumors  - 5/30/24: NLP. Irritating, is out of drops. Will refill atropine, pf, timolol, aileen. B-scan due in 6 months.   - 12/5/2024: NLP and irritating. PT interested in evisceration. Will start lubrication gel and encourage compliance with drops.  B-scan flat with no masses OD.   - 4/10/25: Vincent day. Patient states he now does not want to have eye removed at this time. Had pacemaker placed last week for sick sinus syndrome/bradycardia. Will change timolol to Dorzolamide. IOP 45 today, in minimal discomfort. Otherwise continue Atropine, PF.   - RTC general 3-4 months symptom check, sooner PRN      2. ERM, OD  - Previously imaged on Mac OCT in January 2019  - Mild-to-moderate in severity per Dr. Bowles in June 2019  - Stable thickening on OCT Mac 9/2/20, no IRF/SRF seen  - Likely visually significant given NIKIA results 9/2/20 (20/150) in addition to #1  - CTM    3. Glaucoma suspect, OU  4. ANGLE closure suspect   - Previous C/D ratio documented at 0.7 OS. However 8/2/22 C/D appears to be closer to 0.4 and p/s.  - Last //582  - Gonio 9/2/20: unable to 2/2 edema OD, but angle appears narrow temporally via Von Herick estimation//open to  OS  - OCT RNFL 6/2023 all green OS, small cup on dilated exam- can stop further octn at this time   - IOP wnl OS  - Low suspicion for glaucoma OS at this time. Can monitor with annual DFE now    5. PCIOL OS s/p  yag cap  - Continue to monitor  - difficult posterior view due to patient cooperation    6. H/o retinal detachment, OD  - S/p PPV/lensectomy/IOL removal/EL/AFx/C3F8 16% for dislocated sulcus IOL with retinal detachment OD    7. Monocular precautions   - poly carb given 12/5/2024  - difficult posterior exam due to patient cooperation, will get optos photos q6mo      RTC 3-4 months general, DFE OS, discuss enucleation OD  PRN [facility]

## 2025-05-28 ENCOUNTER — RESULTS FOLLOW-UP (OUTPATIENT)
Dept: HEMATOLOGY/ONCOLOGY | Facility: CLINIC | Age: 70
End: 2025-05-28

## 2025-05-28 ENCOUNTER — OFFICE VISIT (OUTPATIENT)
Dept: HEMATOLOGY/ONCOLOGY | Facility: CLINIC | Age: 70
End: 2025-05-28
Attending: INTERNAL MEDICINE
Payer: MEDICARE

## 2025-05-28 ENCOUNTER — DOCUMENTATION ONLY (OUTPATIENT)
Dept: HEMATOLOGY/ONCOLOGY | Facility: CLINIC | Age: 70
End: 2025-05-28
Payer: MEDICARE

## 2025-05-28 VITALS
DIASTOLIC BLOOD PRESSURE: 79 MMHG | BODY MASS INDEX: 22.22 KG/M2 | HEART RATE: 76 BPM | WEIGHT: 150 LBS | OXYGEN SATURATION: 100 % | TEMPERATURE: 99 F | SYSTOLIC BLOOD PRESSURE: 124 MMHG | RESPIRATION RATE: 18 BRPM | HEIGHT: 69 IN

## 2025-05-28 DIAGNOSIS — R76.8 ELEVATED SERUM IMMUNOGLOBULIN FREE LIGHT CHAIN LEVEL: ICD-10-CM

## 2025-05-28 DIAGNOSIS — D70.9 NEUTROPENIA, UNSPECIFIED TYPE: ICD-10-CM

## 2025-05-28 DIAGNOSIS — R77.8 ABNORMAL SPEP: Primary | ICD-10-CM

## 2025-05-28 DIAGNOSIS — E46 PROTEIN-CALORIE MALNUTRITION, UNSPECIFIED SEVERITY: ICD-10-CM

## 2025-05-28 DIAGNOSIS — F17.200 TOBACCO DEPENDENCY: ICD-10-CM

## 2025-05-28 DIAGNOSIS — R77.8 ABNORMAL SPEP: ICD-10-CM

## 2025-05-28 DIAGNOSIS — D89.89 KAPPA LIGHT CHAIN DISEASE: ICD-10-CM

## 2025-05-28 DIAGNOSIS — Z87.898 HISTORY OF CRACK COCAINE USE: ICD-10-CM

## 2025-05-28 DIAGNOSIS — Z87.898 HISTORY OF CRACK COCAINE USE: Primary | ICD-10-CM

## 2025-05-28 DIAGNOSIS — D89.89 KAPPA LIGHT CHAIN DISEASE: Primary | ICD-10-CM

## 2025-05-28 LAB
ALBUMIN SERPL-MCNC: 3.7 G/DL (ref 3.4–4.8)
ALBUMIN/GLOB SERPL: 0.9 RATIO (ref 1.1–2)
ALP SERPL-CCNC: 47 UNIT/L (ref 40–150)
ALT SERPL-CCNC: 9 UNIT/L (ref 0–55)
ANION GAP SERPL CALC-SCNC: 6 MEQ/L
AST SERPL-CCNC: 20 UNIT/L (ref 11–45)
BASOPHILS # BLD AUTO: 0.03 X10(3)/MCL
BASOPHILS NFR BLD AUTO: 0.6 %
BILIRUB SERPL-MCNC: 0.3 MG/DL
BUN SERPL-MCNC: 10.7 MG/DL (ref 8.4–25.7)
CALCIUM SERPL-MCNC: 9.5 MG/DL (ref 8.8–10)
CHLORIDE SERPL-SCNC: 112 MMOL/L (ref 98–107)
CO2 SERPL-SCNC: 22 MMOL/L (ref 23–31)
CREAT SERPL-MCNC: 0.79 MG/DL (ref 0.72–1.25)
CREAT/UREA NIT SERPL: 14
EOSINOPHIL # BLD AUTO: 0.14 X10(3)/MCL (ref 0–0.9)
EOSINOPHIL NFR BLD AUTO: 2.6 %
ERYTHROCYTE [DISTWIDTH] IN BLOOD BY AUTOMATED COUNT: 14.8 % (ref 11.5–17)
GFR SERPLBLD CREATININE-BSD FMLA CKD-EPI: >60 ML/MIN/1.73/M2
GLOBULIN SER-MCNC: 4 GM/DL (ref 2.4–3.5)
GLUCOSE SERPL-MCNC: 65 MG/DL (ref 82–115)
HBV CORE AB SERPL QL IA: REACTIVE
HBV SURFACE AG SERPL QL IA: NONREACTIVE
HCT VFR BLD AUTO: 37.4 % (ref 42–52)
HGB BLD-MCNC: 12.8 G/DL (ref 14–18)
IGA SERPL-MCNC: 149 MG/DL (ref 101–645)
IGG SERPL-MCNC: 1785 MG/DL (ref 540–1822)
IGM SERPL-MCNC: 108 MG/DL (ref 22–240)
IMM GRANULOCYTES # BLD AUTO: 0.01 X10(3)/MCL (ref 0–0.04)
IMM GRANULOCYTES NFR BLD AUTO: 0.2 %
LYMPHOCYTES # BLD AUTO: 2.82 X10(3)/MCL (ref 0.6–4.6)
LYMPHOCYTES NFR BLD AUTO: 52 %
MCH RBC QN AUTO: 30.3 PG (ref 27–31)
MCHC RBC AUTO-ENTMCNC: 34.2 G/DL (ref 33–36)
MCV RBC AUTO: 88.6 FL (ref 80–94)
MONOCYTES # BLD AUTO: 0.42 X10(3)/MCL (ref 0.1–1.3)
MONOCYTES NFR BLD AUTO: 7.7 %
NEUTROPHILS # BLD AUTO: 2 X10(3)/MCL (ref 2.1–9.2)
NEUTROPHILS NFR BLD AUTO: 36.9 %
NRBC BLD AUTO-RTO: 0 %
PLATELET # BLD AUTO: 233 X10(3)/MCL (ref 130–400)
PMV BLD AUTO: 9.4 FL (ref 7.4–10.4)
POTASSIUM SERPL-SCNC: 4.1 MMOL/L (ref 3.5–5.1)
PROT SERPL-MCNC: 7.7 GM/DL (ref 5.8–7.6)
RBC # BLD AUTO: 4.22 X10(6)/MCL (ref 4.7–6.1)
SODIUM SERPL-SCNC: 140 MMOL/L (ref 136–145)
VIT B12 SERPL-MCNC: 641 PG/ML (ref 213–816)
WBC # BLD AUTO: 5.42 X10(3)/MCL (ref 4.5–11.5)

## 2025-05-28 PROCEDURE — 87340 HEPATITIS B SURFACE AG IA: CPT | Performed by: INTERNAL MEDICINE

## 2025-05-28 PROCEDURE — 86704 HEP B CORE ANTIBODY TOTAL: CPT | Performed by: INTERNAL MEDICINE

## 2025-05-28 PROCEDURE — 85025 COMPLETE CBC W/AUTO DIFF WBC: CPT | Performed by: INTERNAL MEDICINE

## 2025-05-28 PROCEDURE — 80053 COMPREHEN METABOLIC PANEL: CPT | Performed by: INTERNAL MEDICINE

## 2025-05-28 PROCEDURE — 82607 VITAMIN B-12: CPT | Performed by: INTERNAL MEDICINE

## 2025-05-28 PROCEDURE — 82784 ASSAY IGA/IGD/IGG/IGM EACH: CPT | Performed by: INTERNAL MEDICINE

## 2025-05-28 PROCEDURE — 83521 IG LIGHT CHAINS FREE EACH: CPT | Performed by: INTERNAL MEDICINE

## 2025-05-28 PROCEDURE — 99214 OFFICE O/P EST MOD 30 MIN: CPT | Mod: PBBFAC | Performed by: INTERNAL MEDICINE

## 2025-05-28 NOTE — PROGRESS NOTES
History:  Past Medical History:   Diagnosis Date    Arthritis     Chronic back pain     Glaucoma     Sick sinus syndrome      Past Surgical History:   Procedure Laterality Date    BACK SURGERY  1985    CATARACT EXTRACTION      CATARACT EXTRACTION      COLONOSCOPY      CORNEAL TRANSPLANT      EPIDURAL STEROID INJECTION INTO LUMBAR SPINE      IMPLANTATION OF LEADLESS PACEMAKER N/A 04/01/2025    Procedure: INSERTION, CARDIAC PACEMAKER, LEADLESS;  Surgeon: Bruce Dalton MD;  Location: [facility] CATH LAB;  Service: Cardiology;  Laterality: N/A;  DUAL CHAMBER AVEIR LEADLESS PPM (SJM)      Social History     Socioeconomic History    Marital status: Single   Tobacco Use    Smoking status: Some Days     Current packs/day: 0.50     Types: Cigarettes     Passive exposure: Never    Smokeless tobacco: Never    Tobacco comments:     Use lozenges for smoking cessation   Substance and Sexual Activity    Alcohol use: Never    Drug use: Not Currently     Social Drivers of Health     Financial Resource Strain: Patient Declined (4/1/2025)    Overall Financial Resource Strain (CARDIA)     Difficulty of Paying Living Expenses: Patient declined   Food Insecurity: Patient Declined (4/1/2025)    Hunger Vital Sign     Worried About Running Out of Food in the Last Year: Patient declined     Ran Out of Food in the Last Year: Patient declined   Transportation Needs: Patient Declined (4/1/2025)    PRAPARE - Transportation     Lack of Transportation (Medical): Patient declined     Lack of Transportation (Non-Medical): Patient declined   Stress: Patient Declined (4/1/2025)    [employer] - Occupational Stress Questionnaire     Feeling of Stress : Patient declined   Housing Stability: Patient Declined (4/1/2025)    Housing Stability Vital Sign     Unable to Pay for Housing in the Last Year: Patient declined     Homeless in the Last Year: Patient declined      No family history on file.     Reason for Follow-up:  Reason  for consultation:   Kappa light chain gammopathy  Chronic mild neutropenia    History of Present Illness:   Elevated kappa light chains      Oncologic/Hematologic History:  Oncology History    No history exists.   Past medical history: Arthritis; chronic back pain; glaucoma; sick sinus syndrome; chronic back pain; has required epidural injections in the back; sacroiliitis; lumbar spondylosis; lumbar radiculopathy; S/P SI joint injection; depression; bradycardia (required permanent pacemaker placement); mild mitral regurgitation; hypertension; history of cocaine abuse in the past; vitamin-D deficiency; chronic hepatitis-C  -08/01/2022: MRI lumbar spine without contrast (comparison: MRI lumbar spine 03/23/2018, CT A/P osseous structures 05/17/2019):  Lumbar degenerative disc disease and spondylosis  -06/30/2023:  CT lumbar spine with contrast (comparison: MRI lumbar spine 08/01/2022; indication, spinal stenosis without neurogenic claudication): Severe degenerative spinal canal stenosis at L3-L4, moderate at L2-L3, mild L L4-L5; multilevel neural foraminal stenosis  Procedure/surgical history:  Back surgery 1985; cataract extraction; glaucoma procedures; colonoscopy 2024 ([facility] [place], according with the patient's; apparently, unremarkable); corneal transplant; implantation of leadless pacemaker 04/01/2025 (dual-chamber leadless pacemaker placement)    Social history:  Single.  Lives in [place].  Used to work in all fields has not worked since 1983 secondary to chronic back pain.  No children.  Has been smoking 1 pack of cigarettes over 2-3 days for 40 years.  Quit drinking alcohol 10 years ago; before that, used to drink 4-5 beers every month, drank for 40 years.  Smoked crack cocaine for 10-20 years; clean for 10 years.  Family history: Father experienced lung cancer at age 64; used to smoke   Health maintenance: PCP at [facility].  Says that he had colonoscopy done by YVETTE Delgado [place], in 2024, and that it was normal.      69-year-old gentleman, referred by Donell Moreland Jr., MD, pain management, for evaluation of elevated kappa light chains abnormal SPEP.      Labs reviewed:  -03/19/2025:  CBC: Hemoglobin 13.6  -history of very mild anemia: Hemoglobin: 13.9 on 04/2025; 13.3 on 04/2025; 13.6 on 03/19/2025, etc.  -chronic mild neutropenia: ANC: 1.69 on 03/19/2025, 2.00 on 02/20/2025, 1.58 on 02/04/2025, 2.77 normal on 12/11/2024, 1.27 on 02/05/2024    -chemistry profile reviewed:  -03/19/2025:  -02/20/2025: CMP:  Essentially unremarkable; normal kidney function, no hypercalcemia    -02/05/2024:  IgG 2236 elevated; IgM, IgA normal; no monoclonal bands per SPEP and AUGUSTO; kappa/lambda ratio 1.9 elevated, kappa 3.78 elevated, lambda normal   -12/11/2024:  No monoclonal bands per SPEP and AUGUSTO; kappa/lambda ratio not checked    05/28/2025:  Pleasant, healthy-appearing gentleman who presents for initial hematology consultation.  In no acute discomfort.    Says that overall, he feels good except for chronic low back pain.  No weakness, fatigue, malaise, anorexia, unintentional weight loss, any new lumps or lymphadenopathy, unusual headaches, focal neurological symptoms, repeated infections, chest pain, cough, dyspnea, hemoptysis, sputum production, abdominal pain, nausea, vomiting, change in bowel habits, GI bleeding, any urinary problems, bone pains, etc..  ECOG 0.    Interval History:  [No matching plan found]   [No matching plan found]     Immunization History   Administered Date(s) Administered    COVID-19, MRNA, LN-S, PF (Pfizer) (Gray Cap) 07/22/2022    COVID-19, MRNA, LN-S, PF (Pfizer) (Purple Cap) 02/25/2021, 03/18/2021, 10/01/2021    COVID-19, mRNA, LNP-S, bivalent booster, PF (PFIZER OMICRON) 11/29/2022    Hepatitis A / Hepatitis B 01/08/2018    Influenza - Quadrivalent - High Dose - PF (65 years and older) 09/29/2021, 10/18/2022    Influenza - Quadrivalent - MDCK - PF 09/30/2019     Influenza - Quadrivalent - PF *Preferred* (6 months and older) 12/21/2018, 09/21/2020    Influenza - Trivalent - Fluarix, Flulaval, Fluzone, Afluria - PF 10/02/2017, 01/07/2019, 09/30/2019    Pneumococcal Polysaccharide - 23 Valent 01/11/2023    Zoster Recombinant 01/17/2023, 03/23/2023       Allergies as of 05/28/2025    (No Known Allergies)     Medications:  Medications Ordered Prior to Encounter[1]    Review of Systems:   All systems reviewed and found to be negative except for the symptoms detailed above    Physical Examination:   VITAL SIGNS:   Vitals:    05/28/25 0953   BP: 124/79   Pulse: 76   Resp: 18   Temp: 98.9 °F (37.2 °C)     GENERAL:  In no apparent distress.    HEAD:  No signs of head trauma.  EYES:  Pupils are equal.  Extraocular motions intact.    EARS:  Hearing grossly intact.  MOUTH:  Oropharynx is normal.   NECK:  No adenopathy, no JVD.     CHEST:  Chest with clear breath sounds bilaterally.  No wheezes, rales, rhonchi.    CARDIAC:  Regular rate and rhythm.  S1 and S2, without murmurs, gallops, rubs.  VASCULAR:  No Edema.  Peripheral pulses normal and equal in all extremities.  ABDOMEN:  Soft, without detectable tenderness.  No sign of distention.  No   rebound or guarding, and no masses palpated.   Bowel Sounds normal.  MUSCULOSKELETAL:  Good range of motion of all major joints. Extremities without clubbing, cyanosis or edema.    NEUROLOGIC EXAM:  Alert and oriented x 3.  No focal sensory or strength deficits.   Speech normal.  Follows commands.  PSYCHIATRIC:  Mood normal.    Assessment:  Problem List Items Addressed This Visit       Tobacco dependency    History of crack cocaine use - Primary    Elevated serum immunoglobulin free light chain level    Neutropenia     Other Visit Diagnoses         Abnormal SPEP              Orders for 05/28/2025:   Check CBC, CMP, SPEP, AUGUSTO, FLC assay, 24 hour urine for total protein, UPEP, urine AUGUSTO, urine FLC assay  -check B12 level, folic acid level, copper  level, flow cytometry of blood (leukemia/lymphoma panel), peripheral blood smear examination by pathology, hep B surface antigen, hep B core antibody total, HIV serology, RENU, anti double-stranded DNA antibody  Follow-up in 3 weeks    Above discussed with the patient.  All questions answered.    Discussed labs and scans and gave him copies of relevant results.  Plan of management discussed.  He understands and agrees with this plan.  ==================================    # Kappa light chain gammopathy:  -02/05/2024:  IgG 2236 elevated; IgM, IgA normal; no monoclonal bands per SPEP and AUGUSTO; kappa/lambda ratio 1.9 elevated, kappa 3.78 elevated, lambda normal   -12/11/2024:  No monoclonal bands per SPEP and AUGUSTO; kappa/lambda ratio not checked  -renal function normal; no hypercalcemia; no significant anemia  >>>  Plan:  -likely, kappa light chain MGUS but we will investigate  -check CBC, CMP, SPEP, AUGUSTO, FLC assay, 24 hour urine for total protein, UPEP, urine AUGUSTO, and urine FLC assay  -follow-up in 3 weeks, to plan further  -given kappa/lambda ratio <8.0, absence of monoclonal protein, and absence of significant laboratory abnormalities and symptoms, no pressing need of bone marrow examination at this time    #Chronic mild nonprogressive variable neutropenia:   -ANC: 1.69 on 03/19/2025, 2.00 on 02/20/2025, 1.58 on 02/04/2025, 2.77 normal on 12/11/2024, 1.27 on 02/05/2024  >>>  Plan:  -most likely, benign ethnic neutropenia  -check B12 level, folic acid level, copper level, flow cytometry of blood (leukemia/lymphoma panel), peripheral blood smear examination by pathology, hep B surface antigen, hep B core antibody total, HIV serology, RENU, anti double-stranded DNA antibody      Chronic back pain, lumbar DJD, sacroiliitis  History of sick sinus syndrome, requiring permanent pacemaker placement  Back surgery 1995, epidural injections  , tobacco abuse, history of alcohol use, history of crack cocaine use      Follow-up:  No  follow-ups on file.           [1]   Current Outpatient Medications on File Prior to Visit   Medication Sig Dispense Refill    atropine 1% (ISOPTO ATROPINE) 1 % Drop Place 1 drop into the right eye once daily. 5 mL 11    dorzolamide (TRUSOPT) 2 % ophthalmic solution Place 1 drop into the right eye 3 (three) times daily. 10 mL 11    DULoxetine (CYMBALTA) 30 MG capsule Take 120 mg by mouth once daily.      ELIQUIS 5 mg Tab Take 5 mg by mouth 2 (two) times daily.      gabapentin (NEURONTIN) 800 MG tablet Take 800 mg by mouth 2 (two) times daily.      hydrOXYzine pamoate (VISTARIL) 25 MG Cap Take 1-2 capsules by mouth nightly as needed.      mirtazapine (REMERON) 15 MG tablet 1 tablet at bedtime Orally Once a day for appetite for 30 days      nortriptyline (PAMELOR) 10 MG capsule Take 3 capsules by mouth nightly.      prednisoLONE acetate (PRED FORTE) 1 % DrpS Place 1 drop into the right eye 4 (four) times daily. 5 mL 5    sildenafiL (VIAGRA) 100 MG tablet 1/2 to 1 tablet as needed Orally Once a day for 30 days      sodium chloride 5% (KARO 128) 5 % ophthalmic solution Place 1 drop into the right eye every 4 (four) hours as needed. 15 mL 5    temazepam (RESTORIL) 30 mg capsule 1 capsule.       No current facility-administered medications on file prior to visit.

## 2025-05-28 NOTE — PROGRESS NOTES
Patient had labs drawn today in clinic and it was recommended that he stay for results. Patient stated that he needed to leave. Informed patient that oncology clinic would call if he needed to come back

## 2025-05-28 NOTE — PROGRESS NOTES
Hep B core antibody reactive.  Hep B surface antigen nonreactive.      Please order hep B virus quantitative DNA level in blood.

## 2025-05-28 NOTE — Clinical Note
Orders for 05/28/2025:  Check CBC, CMP, SPEP, AUGUSTO, FLC assay, 24 hour urine for total protein, UPEP, urine AUGUSTO, urine FLC assay Follow-up in 3 weeks

## 2025-05-28 NOTE — Clinical Note
-check B12 level, folic acid level, copper level, flow cytometry of blood (leukemia/lymphoma panel), peripheral blood smear examination by pathology, hep B surface antigen, hep B core antibody total, HIV serology, RENU, anti double-stranded DNA antibody

## 2025-05-29 ENCOUNTER — LAB VISIT (OUTPATIENT)
Dept: HEMATOLOGY/ONCOLOGY | Facility: CLINIC | Age: 70
End: 2025-05-29
Payer: MEDICARE

## 2025-05-29 DIAGNOSIS — E46 PROTEIN-CALORIE MALNUTRITION, UNSPECIFIED SEVERITY: ICD-10-CM

## 2025-05-29 DIAGNOSIS — D89.89 KAPPA LIGHT CHAIN DISEASE: Primary | ICD-10-CM

## 2025-05-29 DIAGNOSIS — Z87.898 HISTORY OF CRACK COCAINE USE: ICD-10-CM

## 2025-05-29 DIAGNOSIS — D89.89 KAPPA LIGHT CHAIN DISEASE: ICD-10-CM

## 2025-05-29 DIAGNOSIS — F17.200 TOBACCO DEPENDENCY: ICD-10-CM

## 2025-05-29 DIAGNOSIS — D70.9 NEUTROPENIA, UNSPECIFIED TYPE: ICD-10-CM

## 2025-05-29 DIAGNOSIS — R77.8 ABNORMAL SPEP: ICD-10-CM

## 2025-05-29 DIAGNOSIS — D81.819 BIOTIN-DEPENDENT CARBOXYLASE DEFICIENCY, UNSPECIFIED: ICD-10-CM

## 2025-05-29 DIAGNOSIS — R76.8 ELEVATED SERUM IMMUNOGLOBULIN FREE LIGHT CHAIN LEVEL: ICD-10-CM

## 2025-05-29 LAB
HIV 1+2 AB+HIV1 P24 AG SERPL QL IA: NONREACTIVE
KAPPA LC FREE SER NEPH-MCNC: 3.72 MG/DL (ref 0.33–1.94)
KAPPA LC FREE/LAMBDA FREE SER NEPH: 2.24 {RATIO} (ref 0.26–1.65)
LAMBDA LC FREE SERPL-MCNC: 1.66 MG/DL (ref 0.57–2.63)
PATH REV: NORMAL

## 2025-05-29 PROCEDURE — 87389 HIV-1 AG W/HIV-1&-2 AB AG IA: CPT

## 2025-05-29 PROCEDURE — 88187 FLOWCYTOMETRY/READ 2-8: CPT

## 2025-05-29 PROCEDURE — 86225 DNA ANTIBODY NATIVE: CPT

## 2025-05-29 PROCEDURE — 88185 FLOWCYTOMETRY/TC ADD-ON: CPT

## 2025-05-29 PROCEDURE — 36415 COLL VENOUS BLD VENIPUNCTURE: CPT | Performed by: INTERNAL MEDICINE

## 2025-05-29 PROCEDURE — 87517 HEPATITIS B DNA QUANT: CPT

## 2025-05-29 PROCEDURE — 86039 ANTINUCLEAR ANTIBODIES (ANA): CPT

## 2025-05-29 PROCEDURE — 88184 FLOWCYTOMETRY/ TC 1 MARKER: CPT | Performed by: INTERNAL MEDICINE

## 2025-05-30 LAB
DSDNA IGG SERPL IA-ACNC: <10 IU/ML (ref 0–99)
HBV DNA SERPL NAA+PROBE-ACNC: NOT DETECTED [IU]/ML
HEMATOLOGIST REVIEW: NORMAL

## 2025-05-31 LAB
FLOW CYTOMETRY SPECIALIST REVIEW: NORMAL
M REASON FOR REFERRAL: NORMAL
PATH REPORT.FINAL DX SPEC: NORMAL
PATH REPORT.MICROSCOPIC SPEC OTHER STN: NORMAL
RELEVANT DIAGNOSTIC TESTS/LABORATORY DATA NOTE: NORMAL
SPECIMEN SOURCE: NORMAL

## 2025-06-02 LAB
BEAKER SEE SCANNED REPORT: NORMAL
BEAKER SEE SCANNED REPORT: NORMAL

## 2025-06-02 PROCEDURE — 84166 PROTEIN E-PHORESIS/URINE/CSF: CPT | Performed by: INTERNAL MEDICINE

## 2025-06-05 ENCOUNTER — HOSPITAL ENCOUNTER (OUTPATIENT)
Dept: RADIOLOGY | Facility: HOSPITAL | Age: 70
Discharge: HOME | End: 2025-06-05
Attending: PAIN MEDICINE
Payer: MEDICARE

## 2025-06-05 DIAGNOSIS — M16.12 OSTEOARTHRITIS OF LEFT HIP: ICD-10-CM

## 2025-06-05 PROCEDURE — 73721 MRI JNT OF LWR EXTRE W/O DYE: CPT | Mod: TC,LT

## 2025-07-01 ENCOUNTER — TELEPHONE (OUTPATIENT)
Dept: HEMATOLOGY/ONCOLOGY | Facility: CLINIC | Age: 70
End: 2025-07-01
Payer: MEDICARE

## 2025-07-02 ENCOUNTER — OFFICE VISIT (OUTPATIENT)
Dept: HEMATOLOGY/ONCOLOGY | Facility: CLINIC | Age: 70
End: 2025-07-02
Attending: INTERNAL MEDICINE
Payer: MEDICARE

## 2025-07-02 VITALS
WEIGHT: 145.81 LBS | DIASTOLIC BLOOD PRESSURE: 89 MMHG | SYSTOLIC BLOOD PRESSURE: 126 MMHG | HEART RATE: 70 BPM | OXYGEN SATURATION: 99 % | BODY MASS INDEX: 21.6 KG/M2 | HEIGHT: 69 IN | TEMPERATURE: 98 F | RESPIRATION RATE: 20 BRPM

## 2025-07-02 DIAGNOSIS — R76.8 ELEVATED SERUM IMMUNOGLOBULIN FREE LIGHT CHAIN LEVEL: Primary | ICD-10-CM

## 2025-07-02 DIAGNOSIS — Z87.898 HISTORY OF CRACK COCAINE USE: Primary | ICD-10-CM

## 2025-07-02 DIAGNOSIS — D89.89 KAPPA LIGHT CHAIN DISEASE: ICD-10-CM

## 2025-07-02 DIAGNOSIS — F17.200 TOBACCO DEPENDENCY: ICD-10-CM

## 2025-07-02 DIAGNOSIS — R76.8 ELEVATED SERUM IMMUNOGLOBULIN FREE LIGHT CHAIN LEVEL: ICD-10-CM

## 2025-07-02 DIAGNOSIS — R76.8 HEPATITIS B CORE ANTIBODY POSITIVE: ICD-10-CM

## 2025-07-02 PROCEDURE — 99213 OFFICE O/P EST LOW 20 MIN: CPT | Mod: PBBFAC | Performed by: INTERNAL MEDICINE

## 2025-07-02 RX ORDER — CHOLECALCIFEROL (VITAMIN D3) 25 MCG
1000 TABLET ORAL
COMMUNITY
Start: 2025-05-08

## 2025-07-02 RX ORDER — HYDROCORTISONE 1 %
1 CREAM (GRAM) TOPICAL
COMMUNITY
Start: 2024-07-25

## 2025-07-02 NOTE — PROGRESS NOTES
History:  Past Medical History:   Diagnosis Date    Arthritis     Chronic back pain     Glaucoma     Sick sinus syndrome      Past Surgical History:   Procedure Laterality Date    BACK SURGERY  1985    CATARACT EXTRACTION      CATARACT EXTRACTION      COLONOSCOPY      CORNEAL TRANSPLANT      EPIDURAL STEROID INJECTION INTO LUMBAR SPINE      IMPLANTATION OF LEADLESS PACEMAKER N/A 04/01/2025    Procedure: INSERTION, CARDIAC PACEMAKER, LEADLESS;  Surgeon: Bruce Dalton MD;  Location: [facility] CATH LAB;  Service: Cardiology;  Laterality: N/A;  DUAL CHAMBER AVEIR LEADLESS PPM (SJM)      Social History     Socioeconomic History    Marital status: Single   Tobacco Use    Smoking status: Some Days     Current packs/day: 0.50     Types: Cigarettes     Passive exposure: Never    Smokeless tobacco: Never    Tobacco comments:     Use lozenges for smoking cessation   Substance and Sexual Activity    Alcohol use: Never    Drug use: Not Currently     Social Drivers of Health     Financial Resource Strain: Patient Declined (4/1/2025)    Overall Financial Resource Strain (CARDIA)     Difficulty of Paying Living Expenses: Patient declined   Food Insecurity: Patient Declined (4/1/2025)    Hunger Vital Sign     Worried About Running Out of Food in the Last Year: Patient declined     Ran Out of Food in the Last Year: Patient declined   Transportation Needs: Patient Declined (4/1/2025)    PRAPARE - Transportation     Lack of Transportation (Medical): Patient declined     Lack of Transportation (Non-Medical): Patient declined   Stress: Patient Declined (4/1/2025)    [place] [place] [employer] - Occupational Stress Questionnaire     Feeling of Stress : Patient declined   Housing Stability: Patient Declined (4/1/2025)    Housing Stability Vital Sign     Unable to Pay for Housing in the Last Year: Patient declined     Homeless in the Last Year: Patient declined      No family history on file.     Reason for Follow-up:  Kappa  light chain gammopathy  Chronic mild neutropenia    History of Present Illness:   Follow-up (Patient reported having numbness at the tips of fingers sometimes.)      Oncologic/Hematologic History:  Oncology History    No history exists.   Past medical history: Arthritis; chronic back pain; glaucoma; sick sinus syndrome; chronic back pain; has required epidural injections in the back; sacroiliitis; lumbar spondylosis; lumbar radiculopathy; S/P SI joint injection; depression; bradycardia (required permanent pacemaker placement); mild mitral regurgitation; hypertension; history of cocaine abuse in the past; vitamin-D deficiency; chronic hepatitis-C  -08/01/2022: MRI lumbar spine without contrast (comparison: MRI lumbar spine 03/23/2018, CT A/P osseous structures 05/17/2019):  Lumbar degenerative disc disease and spondylosis  -06/30/2023:  CT lumbar spine with contrast (comparison: MRI lumbar spine 08/01/2022; indication, spinal stenosis without neurogenic claudication): Severe degenerative spinal canal stenosis at L3-L4, moderate at L2-L3, mild L L4-L5; multilevel neural foraminal stenosis  Procedure/surgical history:  Back surgery 1985; cataract extraction; glaucoma procedures; colonoscopy 2024 (Dr. Gutierrez [facility], according with the patient's; apparently, unremarkable); corneal transplant; implantation of leadless pacemaker 04/01/2025 (dual-chamber leadless pacemaker placement)    Social history:  Single.  Lives in [place].  Used to work in all fields has not worked since 1983 secondary to chronic back pain.  No children.  Has been smoking 1 pack of cigarettes over 2-3 days for 40 years.  Quit drinking alcohol 10 years ago; before that, used to drink 4-5 beers every month, drank for 40 years.  Smoked crack cocaine for 10-20 years; clean for 10 years.  Family history: Father experienced lung cancer at age 64; used to smoke   Health maintenance: PCP at [facility].  Says that he had colonoscopy done by  Dr. Gutierrez, , [place], in 2024, and that it was normal.      69-year-old gentleman, referred by Donell Moreland Jr., MD, pain management, for evaluation of elevated kappa light chains abnormal SPEP.      Labs reviewed:  -03/19/2025:  CBC: Hemoglobin 13.6  -history of very mild anemia: Hemoglobin: 13.9 on 04/2025; 13.3 on 04/2025; 13.6 on 03/19/2025, etc.  -chronic mild neutropenia: ANC: 1.69 on 03/19/2025, 2.00 on 02/20/2025, 1.58 on 02/04/2025, 2.77 normal on 12/11/2024, 1.27 on 02/05/2024    -chemistry profile reviewed:  -03/19/2025:  -02/20/2025: CMP:  Essentially unremarkable; normal kidney function, no hypercalcemia    -02/05/2024:  IgG 2236 elevated; IgM, IgA normal; no monoclonal bands per SPEP and AUGUSTO; kappa/lambda ratio 1.9 elevated, kappa 3.78 elevated, lambda normal   -12/11/2024:  No monoclonal bands per SPEP and AUGUSTO; kappa/lambda ratio not checked    05/28/2025:  Pleasant, healthy-appearing gentleman who presents for initial hematology consultation.  In no acute discomfort.    Says that overall, he feels good except for chronic low back pain.  No weakness, fatigue, malaise, anorexia, unintentional weight loss, any new lumps or lymphadenopathy, unusual headaches, focal neurological symptoms, repeated infections, chest pain, cough, dyspnea, hemoptysis, sputum production, abdominal pain, nausea, vomiting, change in bowel habits, GI bleeding, any urinary problems, bone pains, etc..  ECOG 0.    Interval History:  [No matching plan found]   [No matching plan found]     07/02/2025:   -05/28/2025: WBC 5.42, hemoglobin 12.8, MCV normal, platelets normal, ANC 2.00, neutrophils 36.9%, lymphocytes 52%  -05/28/2025: B12 level 641 normal; folate 9.4 normal on 05/29/2025  -05/28/2025:  CMP: Essentially unremarkable  -05/28/2025: IgG, IgA, IgM normal; no monoclonal bands per SPEP and AUGUSTO; kappa/lambda ratio 2.4 elevated; kappa 3.72 elevated; lambda normal  -05/29/2025:  Peripheral blood: Flow cytometry: Negative  for monotypic B-cell population or increase in blasts; normal immunophenotypic results  -05/28/2025:  Hep B core antibody total reactive; hep B surface antigen nonreactive  -05/29/2025:  Hep B virus DNA qualitative PCR:  Not detected  -05/29/2025: HIV nonreactive  -05/29/2025:  DNA double-stranded antibody IgG <10 (negative)  -05/29/2025:  Serum copper level 101.4 mcg/dL normal  -06/02/2025: 24 hour urine:  UPEP negative for M spike; 24 hour urine total protein 98 mg/24 hours (normal)  Presents for a follow-up visit.  Doing well.  Overall, stable.  Some numbness in fingertips at X.  Appetite is okay.  Lower back pain, 10/10 severity, chronic.  Follows up with PCP for these issues.  Chronic back pain; glaucoma; sick sinus syndrome; has required epidural injections in the back; sacroiliitis; lumbar spondylosis; lumbar radiculopathy; S/P SI joint injection; depression; bradycardia (required permanent pacemaker placement); mild mitral regurgitation; hypertension; history of cocaine abuse in the past; vitamin-D deficiency; chronic hepatitis-C  Back surgery 1985; cataract extraction; glaucoma procedures; colonoscopy 2024 (Dr. Gutierrez [facility], according to the patient's; apparently, unremarkable); corneal transplant; implantation of leadless pacemaker 04/01/2025 (dual-chamber leadless pacemaker placement)    Used to work in all fields has not worked since 1983 secondary to chronic back pain.    Has been smoking 1 pack of cigarettes over 2-3 days for 40 years.    Quit drinking alcohol 10 years ago; before that, used to drink 4-5 beers every month, drank for 40 years.  Smoked crack cocaine for 10-20 years; clean for 10 years.    Immunization History   Administered Date(s) Administered    COVID-19, MRNA, LN-S, PF (Pfizer) (Gray Cap) 07/22/2022    COVID-19, MRNA, LN-S, PF (Pfizer) (Purple Cap) 02/25/2021, 03/18/2021, 10/01/2021    COVID-19, mRNA, LNP-S, bivalent booster, PF (PFIZER OMICRON) 11/29/2022    Hepatitis A /  Hepatitis B 01/08/2018    Influenza - Quadrivalent - High Dose - PF (65 years and older) 09/29/2021, 10/18/2022    Influenza - Quadrivalent - MDCK - PF 09/30/2019    Influenza - Quadrivalent - PF *Preferred* (6 months and older) 12/21/2018, 09/21/2020    Influenza - Trivalent - Fluarix, Flulaval, Fluzone, Afluria - PF 10/02/2017, 01/07/2019, 09/30/2019    Pneumococcal Polysaccharide - 23 Valent 01/11/2023    Zoster Recombinant 01/17/2023, 03/23/2023     Allergies as of 07/02/2025    (No Known Allergies)     Medications:  Medications Ordered Prior to Encounter[1]    Review of Systems:   All systems reviewed and found to be negative except for the symptoms detailed above    Physical Examination:   VITAL SIGNS:   Vitals:    07/02/25 1459   BP: 126/89   Pulse: 70   Resp: 20   Temp: 97.8 °F (36.6 °C)     GENERAL:  In no apparent distress.    HEAD:  No signs of head trauma.  EYES:  Pupils are equal.  Extraocular motions intact.    EARS:  Hearing grossly intact.  MOUTH:  Oropharynx is normal.   NECK:  No adenopathy, no JVD.     CHEST:  Chest with clear breath sounds bilaterally.  No wheezes, rales, rhonchi.    CARDIAC:  Regular rate and rhythm.  S1 and S2, without murmurs, gallops, rubs.  VASCULAR:  No Edema.  Peripheral pulses normal and equal in all extremities.  ABDOMEN:  Soft, without detectable tenderness.  No sign of distention.  No   rebound or guarding, and no masses palpated.   Bowel Sounds normal.  MUSCULOSKELETAL:  Good range of motion of all major joints. Extremities without clubbing, cyanosis or edema.    NEUROLOGIC EXAM:  Alert and oriented x 3.  No focal sensory or strength deficits.   Speech normal.  Follows commands.  PSYCHIATRIC:  Mood normal.    Assessment:  Problem List Items Addressed This Visit       Tobacco dependency    History of crack cocaine use - Primary    Elevated serum immunoglobulin free light chain level     Orders for 07/02/2025:   Follow-up in November with repeat CBC, CMP, SPEP, AUGUSTO, FLC  assay    Above discussed with the patient.  All questions answered.    He understands and agrees with this plan.  ==================================    # Kappa light chain gammopathy:  -02/05/2024:  IgG 2236 elevated; IgM, IgA normal; no monoclonal bands per SPEP and AUGUSTO; kappa/lambda ratio 1.9 elevated, kappa 3.78 elevated, lambda normal   -12/11/2024:  No monoclonal bands per SPEP and AUGUSTO; kappa/lambda ratio not checked  -renal function normal; no hypercalcemia; no significant anemia  -05/28/2025: IgG, IgA, IgM normal; no monoclonal bands per SPEP and AUGUSTO; kappa/lambda ratio 2.4 elevated; kappa 3.72 elevated; lambda normal  -06/02/2025: 24 hour urine:  UPEP negative for M spike; 24 hour urine total protein 98 mg/24 hours (normal)  >>>  Plan:  -likely, kappa light chain MGUS but we will investigate  -recheck CBC, CMP, SPEP, AUGUSTO, FLC assay in 6 months (November 2025), then follow-up visit  -no pressing indication of bone marrow evaluation at this time, nor of any imaging studies like PET-CT  -given kappa/lambda ratio <8.0, absence of monoclonal protein, and absence of significant laboratory abnormalities and symptoms, no pressing need of bone marrow examination at this time    #Chronic mild nonprogressive variable neutropenia:   -ANC: 1.69 on 03/19/2025, 2.00 on 02/20/2025, 1.58 on 02/04/2025, 2.77 normal on 12/11/2024, 1.27 on 02/05/2024    -05/28/2025: WBC 5.42, hemoglobin 12.8, MCV normal, platelets normal, ANC 2.00, neutrophils 36.9%, lymphocytes 52%  -05/28/2025: B12 level 641 normal; folate 9.4 normal on 05/29/2025  -05/28/2025:  CMP: Essentially unremarkable  -05/29/2025:  Peripheral blood: Flow cytometry: Negative for monotypic B-cell population or increase in blasts; normal immunophenotypic results  -05/28/2025:  Hep B core antibody total reactive; hep B surface antigen nonreactive  -05/29/2025:  Hep B virus DNA qualitative PCR:  Not detected  -05/29/2025: HIV nonreactive  -05/29/2025:  DNA double-stranded  antibody IgG <10 (negative)  -05/29/2025:  Serum copper level 101.4 mcg/dL normal  >>>  Plan:  -most likely, benign ethnic neutropenia  -no need to investigate any further      Chronic back pain, lumbar DJD, sacroiliitis  History of sick sinus syndrome, requiring permanent pacemaker placement  Back surgery 1995, epidural injections  tobacco abuse, history of alcohol use, history of crack cocaine use      Follow-up:  No follow-ups on file.             [1]   Current Outpatient Medications on File Prior to Visit   Medication Sig Dispense Refill    atropine 1% (ISOPTO ATROPINE) 1 % Drop Place 1 drop into the right eye once daily. 5 mL 11    dorzolamide (TRUSOPT) 2 % ophthalmic solution Place 1 drop into the right eye 3 (three) times daily. 10 mL 11    DULoxetine (CYMBALTA) 30 MG capsule Take 120 mg by mouth once daily.      ELIQUIS 5 mg Tab Take 5 mg by mouth 2 (two) times daily.      gabapentin (NEURONTIN) 800 MG tablet Take 800 mg by mouth 2 (two) times daily.      hydrocortisone 1 % cream 1 Application.      hydrOXYzine pamoate (VISTARIL) 25 MG Cap Take 1-2 capsules by mouth nightly as needed.      mirtazapine (REMERON) 15 MG tablet 1 tablet at bedtime Orally Once a day for appetite for 30 days      nortriptyline (PAMELOR) 10 MG capsule Take 3 capsules by mouth nightly.      vitamin D (VITAMIN D3) 1000 units Tab Take 1,000 Units by mouth.      sildenafiL (VIAGRA) 100 MG tablet 1/2 to 1 tablet as needed Orally Once a day for 30 days (Patient not taking: Reported on 7/2/2025)      sodium chloride 5% (KARO 128) 5 % ophthalmic solution Place 1 drop into the right eye every 4 (four) hours as needed. (Patient not taking: Reported on 7/2/2025) 15 mL 5    temazepam (RESTORIL) 30 mg capsule 1 capsule. (Patient not taking: Reported on 7/2/2025)       No current facility-administered medications on file prior to visit.

## 2025-08-04 ENCOUNTER — OFFICE VISIT (OUTPATIENT)
Dept: OPHTHALMOLOGY | Facility: CLINIC | Age: 70
End: 2025-08-04
Payer: MEDICARE

## 2025-08-04 VITALS — BODY MASS INDEX: 21.47 KG/M2 | HEIGHT: 70 IN | WEIGHT: 150 LBS

## 2025-08-04 DIAGNOSIS — H35.371 MACULAR PUCKER, RIGHT: ICD-10-CM

## 2025-08-04 DIAGNOSIS — H18.11 BULLOUS KERATOPATHY OF RIGHT EYE: Primary | ICD-10-CM

## 2025-08-04 DIAGNOSIS — H40.051 OCULAR HYPERTENSION OF RIGHT EYE: ICD-10-CM

## 2025-08-04 DIAGNOSIS — Z86.69 HISTORY OF RETINAL DETACHMENT: ICD-10-CM

## 2025-08-04 DIAGNOSIS — Z96.1 PSEUDOPHAKIA: ICD-10-CM

## 2025-08-04 PROCEDURE — 92134 CPTRZ OPH DX IMG PST SGM RTA: CPT | Mod: PBBFAC,PN | Performed by: OPHTHALMOLOGY

## 2025-08-04 PROCEDURE — 99213 OFFICE O/P EST LOW 20 MIN: CPT | Mod: PBBFAC,PN

## 2025-08-04 PROCEDURE — 92134 CPTRZ OPH DX IMG PST SGM RTA: CPT | Mod: PBBFAC,PN

## 2025-08-04 RX ORDER — CHLORZOXAZONE 500 MG/1
500 TABLET ORAL 2 TIMES DAILY PRN
COMMUNITY
Start: 2025-07-24

## 2025-08-04 NOTE — PROGRESS NOTES
HPI    Here for DFE OS/Discuss Enucleation OD.   - No new complaints at present time.  - Glasses are working well.  - Would like to have OD removed due to constant irritation.   Last edited by Gale Vega LPN on 8/4/2025  9:06 AM.            Assessment /Plan     For exam results, see Encounter Report.    Bullous keratopathy of right eye    Ocular hypertension of right eye    History of retinal detachment    Macular pucker, right    Pseudophakia          HPI    Here for DFE OS/Discuss Enucleation OD.   - No new complaints at present time.  - Glasses are working well.  - Would like to have OD removed due to constant irritation.   Last edited by Gale Vega LPN on 8/4/2025  9:06 AM.            Assessment /Plan     For exam results, see Encounter Report.    Bullous keratopathy of right eye    Ocular hypertension of right eye    History of retinal detachment    Macular pucker, right    Pseudophakia        OCTN  5/30/2024 OS: 92 all green   OCTN  8/4/2025 OS: 95 all green     OCTM 5/30/2024 good contour, no srf irf oS   OCTM 8/4/2025 good contour, no srf irf OS     8/2022 b scan OD: no masses   10/27/2023: b scan OD  no masses or tumors, retina appears flat, vitreous clear  12/2024: B-scan flat with no masses OD.    1. Microcystic corneal edema/bullous keratopathy OD  s/p MULTIPLE SURGERIES: most recently repeat DSAEK and IOL 5/2021  - Likely 2/2 h/o multiple surgeries (complex CE, RD repair, secondary IOL) including hx of an iris-sutured IOL  - s/p DSAEK OD (11/1/18); intraop discovered that sutured IOL had been sutured into superior cornea -> graft intentionally decentered inferiorly, could not reconstruct superior anterior chamber without cutting IOL sutures  - Graft became detached POW1 --> went to OR for revision 11/15/18  - BCVA after DSAEK documented 5/2019 @ 20/100 (20/400 prior to DSAEK)  - s/p 5/2021: repeat DSAEK and IOL- - had repeat DSAEK and IOL 5/2021, graft was detached postop, IOL is  decentered  TESTING HISTORY  - Topography done May 2020, noted to have irregular astigmatism  - PLAN BY Dr. Almodovar 10/29/21 - IOP 22 OD. No intervention recommended. Patient with chronic ocular surface pain. May benefit from a procedure to improve comfort. Recommend comfort care in right eye and continued monitoring of left eye. Will try to arrange patient to go to Dr. Almodovar's office for scleral contact lens fitting for comfort OD.   - 8/2/22 provided patient with information to Dr. Almodovar clinic. Start Aileen 128 OD TID. B scan w/o masses OD  - 8/22/23: NLP, painful. Patient not wanting enucleation at this time. Restart aileen OD TID, start timolol qam, continue atropine daily, PF QID.  - 10/27/23: NLP comfortable eye, continue atropine, pf, timolol, aileen. B scan today without masses or tumors  - 5/30/24: NLP. Irritating, is out of drops. Will refill atropine, pf, timolol, aileen. B-scan due in 6 months.   - 12/5/2024: NLP and irritating. PT interested in evisceration. Will start lubrication gel and encourage compliance with drops.  B-scan flat with no masses OD.   - 4/10/25: Vincent day. Patient states he now does not want to have eye removed at this time. Had pacemaker placed last week for sick sinus syndrome/bradycardia. Will change timolol to Dorzolamide. IOP 45 today, in minimal discomfort. Otherwise continue Atropine, PF.   - 8/4/25: patient with discomfort, now wishes for enucleation. Will plan to return to [facility] in 2-3 months. Lubrication ointment QID until then, continue dorzolamide TID  - RTC general 3-4 months symptom check, sooner PRN      2. ERM, OD  - Previously imaged on Mac OCT in January 2019  - Mild-to-moderate in severity per Dr. Bowles in June 2019  - Stable thickening on OCT Mac 9/2/20, no IRF/SRF seen  - Likely visually significant given NIKIA results 9/2/20 (20/150) in addition to #1  - CTM    3. Glaucoma suspect, OU  4. ANGLE closure suspect   - Previous C/D ratio documented at 0.7 OS.  However 8/2/22 C/D appears to be closer to 0.4 and p/s.  - Last //582  - Gonio 9/2/20: unable to 2/2 edema OD, but angle appears narrow temporally via Von Herick estimation//open to  OS  - OCT RNFL 6/2023 all green OS, small cup on dilated exam- can stop further octn at this time   - IOP wnl OS  - Low suspicion for glaucoma OS at this time. Can monitor with annual DFE now  - 8/4/25: DFE stable, CD 0.35, RNFL full, IOP wnl    5. PCIOL OS s/p yag cap  - Continue to monitor  - difficult posterior view due to patient cooperation    6. H/o retinal detachment, OD  - S/p PPV/lensectomy/IOL removal/EL/AFx/C3F8 16% for dislocated sulcus IOL with retinal detachment OD    7. Monocular precautions   - poly carb given 12/5/2024  - difficult posterior exam due to patient cooperation, will get optos photos q6mo      RTC 2-3 months [facility] enucleation planning OD

## (undated) DEVICE — AVEIR DELIVERY CATHETER

## (undated) DEVICE — FLUID DELIVERY SET WITH FILTER

## (undated) DEVICE — Device

## (undated) DEVICE — CANNULA CAPNOFLEX ORAL NASAL

## (undated) DEVICE — INTRODUCER SHEATH 25FR 50CM

## (undated) DEVICE — KIT VASCULAR DILATOR PAS

## (undated) DEVICE — GUIDEWIRE AMPLATZ XSTIFF 260CM

## (undated) DEVICE — SYR 50CC LL

## (undated) DEVICE — PAD DEFIB CADENCE ADULT R2